# Patient Record
Sex: FEMALE | Race: WHITE | NOT HISPANIC OR LATINO | Employment: FULL TIME | URBAN - METROPOLITAN AREA
[De-identification: names, ages, dates, MRNs, and addresses within clinical notes are randomized per-mention and may not be internally consistent; named-entity substitution may affect disease eponyms.]

---

## 2021-04-22 LAB — HIV1+2 AB SERPL QL IA: NONREACTIVE

## 2021-04-23 LAB
ABO (EXTERNAL): NORMAL
HEMOGLOBIN (EXTERNAL): 13.7 G/DL (ref 11.7–15.7)
HEPATITIS B SURFACE ANTIGEN (EXTERNAL): NONREACTIVE
HIV1+2 AB SERPL QL IA: NEGATIVE
HIV1+2 AB SERPL QL IA: NEGATIVE
PLATELET COUNT (EXTERNAL): 359 10E3/UL (ref 150–450)
RH (EXTERNAL): POSITIVE
RUBELLA ANTIBODY IGG (EXTERNAL): POSITIVE
VDRL (SYPHILIS) (EXTERNAL): NONREACTIVE

## 2021-06-15 ENCOUNTER — TRANSCRIBE ORDERS (OUTPATIENT)
Dept: MATERNAL FETAL MEDICINE | Facility: CLINIC | Age: 31
End: 2021-06-15

## 2021-06-15 ENCOUNTER — MEDICAL CORRESPONDENCE (OUTPATIENT)
Dept: HEALTH INFORMATION MANAGEMENT | Facility: CLINIC | Age: 31
End: 2021-06-15

## 2021-06-15 ENCOUNTER — TRANSFERRED RECORDS (OUTPATIENT)
Dept: HEALTH INFORMATION MANAGEMENT | Facility: CLINIC | Age: 31
End: 2021-06-15

## 2021-06-15 DIAGNOSIS — O26.90 PREGNANCY RELATED CONDITION, ANTEPARTUM: Primary | ICD-10-CM

## 2021-06-17 ENCOUNTER — RECORDS - HEALTHEAST (OUTPATIENT)
Dept: ADMINISTRATIVE | Facility: OTHER | Age: 31
End: 2021-06-17

## 2021-07-05 ENCOUNTER — AMBULATORY - HEALTHEAST (OUTPATIENT)
Dept: MATERNAL FETAL MEDICINE | Facility: HOSPITAL | Age: 31
End: 2021-07-05

## 2021-07-09 ENCOUNTER — RECORDS - HEALTHEAST (OUTPATIENT)
Dept: ADMINISTRATIVE | Facility: OTHER | Age: 31
End: 2021-07-09

## 2021-07-09 ENCOUNTER — RECORDS - HEALTHEAST (OUTPATIENT)
Dept: ULTRASOUND IMAGING | Facility: HOSPITAL | Age: 31
End: 2021-07-09

## 2021-07-09 DIAGNOSIS — O26.90 PREGNANCY RELATED CONDITIONS, UNSPECIFIED, UNSPECIFIED TRIMESTER: ICD-10-CM

## 2021-07-20 ENCOUNTER — TRANSCRIBE ORDERS (OUTPATIENT)
Dept: CARDIOLOGY | Facility: HOSPITAL | Age: 31
End: 2021-07-20

## 2021-07-20 DIAGNOSIS — O10.919 CHRONIC HYPERTENSION IN OBSTETRIC CONTEXT: Primary | ICD-10-CM

## 2021-07-22 ENCOUNTER — DOCUMENTATION ONLY (OUTPATIENT)
Dept: ADMINISTRATIVE | Facility: OTHER | Age: 31
End: 2021-07-22

## 2021-07-22 NOTE — PROGRESS NOTES
This encounter was created as part of manual pregnancy episode data conversion for the single EHR project. The following information (where applicable) was manually abstracted from the [employer] Epic instance on July 22, 2021: pregnancy episode name/date, dating, episode encounter linking, pregravid weight, number of fetuses, and pregnancy overview and plan.     Veronica Connor RN   Clinical Informatics

## 2021-08-12 ENCOUNTER — HOSPITAL ENCOUNTER (OUTPATIENT)
Dept: CARDIOLOGY | Facility: HOSPITAL | Age: 31
End: 2021-08-12
Attending: OBSTETRICS & GYNECOLOGY
Payer: COMMERCIAL

## 2021-08-12 LAB
ATRIAL RATE - MUSE: 68 BPM
DIASTOLIC BLOOD PRESSURE - MUSE: NORMAL MMHG
INTERPRETATION ECG - MUSE: NORMAL
P AXIS - MUSE: 48 DEGREES
PR INTERVAL - MUSE: 150 MS
QRS DURATION - MUSE: 76 MS
QT - MUSE: 394 MS
QTC - MUSE: 418 MS
R AXIS - MUSE: 74 DEGREES
SYSTOLIC BLOOD PRESSURE - MUSE: NORMAL MMHG
T AXIS - MUSE: 59 DEGREES
VENTRICULAR RATE- MUSE: 68 BPM

## 2021-11-05 LAB — GROUP B STREPTOCOCCUS (EXTERNAL): NEGATIVE

## 2021-11-27 ENCOUNTER — HOSPITAL ENCOUNTER (INPATIENT)
Facility: HOSPITAL | Age: 31
LOS: 5 days | Discharge: HOME HEALTH | End: 2021-12-02
Attending: OBSTETRICS & GYNECOLOGY | Admitting: OBSTETRICS & GYNECOLOGY
Payer: COMMERCIAL

## 2021-11-27 DIAGNOSIS — O10.919 CHRONIC HYPERTENSION AFFECTING PREGNANCY: ICD-10-CM

## 2021-11-27 LAB
ABO/RH(D): NORMAL
ALBUMIN MFR UR ELPH: <7 MG/DL
ALT SERPL W P-5'-P-CCNC: 11 U/L (ref 0–45)
ANTIBODY SCREEN: NEGATIVE
AST SERPL W P-5'-P-CCNC: 19 U/L (ref 0–40)
CREAT SERPL-MCNC: 0.72 MG/DL (ref 0.6–1.1)
CREAT UR-MCNC: 79 MG/DL
ERYTHROCYTE [DISTWIDTH] IN BLOOD BY AUTOMATED COUNT: 12.8 % (ref 10–15)
GFR SERPL CREATININE-BSD FRML MDRD: >90 ML/MIN/1.73M2
HCT VFR BLD AUTO: 33.4 % (ref 35–47)
HGB BLD-MCNC: 11 G/DL (ref 11.7–15.7)
MCH RBC QN AUTO: 28.3 PG (ref 26.5–33)
MCHC RBC AUTO-ENTMCNC: 32.9 G/DL (ref 31.5–36.5)
MCV RBC AUTO: 86 FL (ref 78–100)
PLATELET # BLD AUTO: 280 10E3/UL (ref 150–450)
PROT/CREAT 24H UR: NORMAL MG/G{CREAT}
RBC # BLD AUTO: 3.89 10E6/UL (ref 3.8–5.2)
SARS-COV-2 RNA RESP QL NAA+PROBE: NEGATIVE
SPECIMEN EXPIRATION DATE: NORMAL
WBC # BLD AUTO: 8.1 10E3/UL (ref 4–11)

## 2021-11-27 PROCEDURE — 250N000013 HC RX MED GY IP 250 OP 250 PS 637: Performed by: OBSTETRICS & GYNECOLOGY

## 2021-11-27 PROCEDURE — 258N000003 HC RX IP 258 OP 636: Performed by: OBSTETRICS & GYNECOLOGY

## 2021-11-27 PROCEDURE — 84156 ASSAY OF PROTEIN URINE: CPT | Performed by: OBSTETRICS & GYNECOLOGY

## 2021-11-27 PROCEDURE — 3E0P7VZ INTRODUCTION OF HORMONE INTO FEMALE REPRODUCTIVE, VIA NATURAL OR ARTIFICIAL OPENING: ICD-10-PCS | Performed by: OBSTETRICS & GYNECOLOGY

## 2021-11-27 PROCEDURE — 85027 COMPLETE CBC AUTOMATED: CPT | Performed by: OBSTETRICS & GYNECOLOGY

## 2021-11-27 PROCEDURE — 86900 BLOOD TYPING SEROLOGIC ABO: CPT | Performed by: OBSTETRICS & GYNECOLOGY

## 2021-11-27 PROCEDURE — 82565 ASSAY OF CREATININE: CPT | Performed by: OBSTETRICS & GYNECOLOGY

## 2021-11-27 PROCEDURE — 87635 SARS-COV-2 COVID-19 AMP PRB: CPT | Performed by: OBSTETRICS & GYNECOLOGY

## 2021-11-27 PROCEDURE — 84450 TRANSFERASE (AST) (SGOT): CPT | Performed by: OBSTETRICS & GYNECOLOGY

## 2021-11-27 PROCEDURE — 84460 ALANINE AMINO (ALT) (SGPT): CPT | Performed by: OBSTETRICS & GYNECOLOGY

## 2021-11-27 PROCEDURE — 36415 COLL VENOUS BLD VENIPUNCTURE: CPT | Performed by: OBSTETRICS & GYNECOLOGY

## 2021-11-27 PROCEDURE — 86780 TREPONEMA PALLIDUM: CPT | Performed by: OBSTETRICS & GYNECOLOGY

## 2021-11-27 PROCEDURE — 120N000001 HC R&B MED SURG/OB

## 2021-11-27 PROCEDURE — 3E033VJ INTRODUCTION OF OTHER HORMONE INTO PERIPHERAL VEIN, PERCUTANEOUS APPROACH: ICD-10-PCS | Performed by: OBSTETRICS & GYNECOLOGY

## 2021-11-27 RX ORDER — MAGNESIUM SULFATE HEPTAHYDRATE 40 MG/ML
2 INJECTION, SOLUTION INTRAVENOUS
Status: DISCONTINUED | OUTPATIENT
Start: 2021-11-27 | End: 2021-12-02 | Stop reason: HOSPADM

## 2021-11-27 RX ORDER — FENTANYL CITRATE 50 UG/ML
50-100 INJECTION, SOLUTION INTRAMUSCULAR; INTRAVENOUS
Status: DISCONTINUED | OUTPATIENT
Start: 2021-11-27 | End: 2021-11-29 | Stop reason: HOSPADM

## 2021-11-27 RX ORDER — NIFEDIPINE 60 MG/1
1 TABLET, EXTENDED RELEASE ORAL DAILY
COMMUNITY
Start: 2021-05-25 | End: 2022-03-01

## 2021-11-27 RX ORDER — PROCHLORPERAZINE 25 MG
25 SUPPOSITORY, RECTAL RECTAL EVERY 12 HOURS PRN
Status: DISCONTINUED | OUTPATIENT
Start: 2021-11-27 | End: 2021-11-29 | Stop reason: HOSPADM

## 2021-11-27 RX ORDER — SODIUM CHLORIDE, SODIUM LACTATE, POTASSIUM CHLORIDE, CALCIUM CHLORIDE 600; 310; 30; 20 MG/100ML; MG/100ML; MG/100ML; MG/100ML
10-125 INJECTION, SOLUTION INTRAVENOUS CONTINUOUS
Status: DISCONTINUED | OUTPATIENT
Start: 2021-11-27 | End: 2021-12-02 | Stop reason: HOSPADM

## 2021-11-27 RX ORDER — METOCLOPRAMIDE 10 MG/1
10 TABLET ORAL EVERY 6 HOURS PRN
Status: DISCONTINUED | OUTPATIENT
Start: 2021-11-27 | End: 2021-11-29 | Stop reason: HOSPADM

## 2021-11-27 RX ORDER — IBUPROFEN 600 MG/1
600 TABLET, FILM COATED ORAL
Status: ACTIVE | OUTPATIENT
Start: 2021-11-27 | End: 2021-12-02

## 2021-11-27 RX ORDER — NALOXONE HYDROCHLORIDE 0.4 MG/ML
0.4 INJECTION, SOLUTION INTRAMUSCULAR; INTRAVENOUS; SUBCUTANEOUS
Status: DISCONTINUED | OUTPATIENT
Start: 2021-11-27 | End: 2021-11-29 | Stop reason: HOSPADM

## 2021-11-27 RX ORDER — KETOROLAC TROMETHAMINE 30 MG/ML
30 INJECTION, SOLUTION INTRAMUSCULAR; INTRAVENOUS
Status: DISPENSED | OUTPATIENT
Start: 2021-11-27 | End: 2021-12-02

## 2021-11-27 RX ORDER — OXYTOCIN 10 [USP'U]/ML
10 INJECTION, SOLUTION INTRAMUSCULAR; INTRAVENOUS
Status: DISCONTINUED | OUTPATIENT
Start: 2021-11-27 | End: 2021-12-02 | Stop reason: HOSPADM

## 2021-11-27 RX ORDER — METOCLOPRAMIDE HYDROCHLORIDE 5 MG/ML
10 INJECTION INTRAMUSCULAR; INTRAVENOUS EVERY 6 HOURS PRN
Status: DISCONTINUED | OUTPATIENT
Start: 2021-11-27 | End: 2021-11-29 | Stop reason: HOSPADM

## 2021-11-27 RX ORDER — PROCHLORPERAZINE MALEATE 10 MG
10 TABLET ORAL EVERY 6 HOURS PRN
Status: DISCONTINUED | OUTPATIENT
Start: 2021-11-27 | End: 2021-11-29 | Stop reason: HOSPADM

## 2021-11-27 RX ORDER — KETOROLAC TROMETHAMINE 30 MG/ML
30 INJECTION, SOLUTION INTRAMUSCULAR; INTRAVENOUS
Status: ACTIVE | OUTPATIENT
Start: 2021-11-27 | End: 2021-12-02

## 2021-11-27 RX ORDER — LORAZEPAM 2 MG/ML
2 INJECTION INTRAMUSCULAR
Status: DISCONTINUED | OUTPATIENT
Start: 2021-11-27 | End: 2021-12-02 | Stop reason: HOSPADM

## 2021-11-27 RX ORDER — CARBOPROST TROMETHAMINE 250 UG/ML
250 INJECTION, SOLUTION INTRAMUSCULAR
Status: DISCONTINUED | OUTPATIENT
Start: 2021-11-27 | End: 2021-11-29 | Stop reason: HOSPADM

## 2021-11-27 RX ORDER — NIFEDIPINE 30 MG
30 TABLET, EXTENDED RELEASE ORAL AT BEDTIME
Status: DISCONTINUED | OUTPATIENT
Start: 2021-11-27 | End: 2021-11-27

## 2021-11-27 RX ORDER — MORPHINE SULFATE 10 MG/ML
10 INJECTION, SOLUTION INTRAMUSCULAR; INTRAVENOUS ONCE
Status: DISCONTINUED | OUTPATIENT
Start: 2021-11-28 | End: 2021-12-02 | Stop reason: HOSPADM

## 2021-11-27 RX ORDER — MISOPROSTOL 200 UG/1
800 TABLET ORAL
Status: DISCONTINUED | OUTPATIENT
Start: 2021-11-27 | End: 2021-11-29 | Stop reason: HOSPADM

## 2021-11-27 RX ORDER — HYDROXYZINE HYDROCHLORIDE 50 MG/1
50-100 TABLET, FILM COATED ORAL EVERY 6 HOURS PRN
Status: DISCONTINUED | OUTPATIENT
Start: 2021-11-27 | End: 2021-11-28

## 2021-11-27 RX ORDER — NALOXONE HYDROCHLORIDE 0.4 MG/ML
0.2 INJECTION, SOLUTION INTRAMUSCULAR; INTRAVENOUS; SUBCUTANEOUS
Status: DISCONTINUED | OUTPATIENT
Start: 2021-11-27 | End: 2021-11-29 | Stop reason: HOSPADM

## 2021-11-27 RX ORDER — MAGNESIUM SULFATE 4 G/50ML
4 INJECTION INTRAVENOUS
Status: DISCONTINUED | OUTPATIENT
Start: 2021-11-27 | End: 2021-12-02 | Stop reason: HOSPADM

## 2021-11-27 RX ORDER — ONDANSETRON 2 MG/ML
4 INJECTION INTRAMUSCULAR; INTRAVENOUS EVERY 6 HOURS PRN
Status: DISCONTINUED | OUTPATIENT
Start: 2021-11-27 | End: 2021-11-29 | Stop reason: HOSPADM

## 2021-11-27 RX ORDER — METHYLERGONOVINE MALEATE 0.2 MG/ML
200 INJECTION INTRAVENOUS
Status: DISCONTINUED | OUTPATIENT
Start: 2021-11-27 | End: 2021-11-29 | Stop reason: HOSPADM

## 2021-11-27 RX ORDER — OXYTOCIN/0.9 % SODIUM CHLORIDE 30/500 ML
340 PLASTIC BAG, INJECTION (ML) INTRAVENOUS CONTINUOUS PRN
Status: DISCONTINUED | OUTPATIENT
Start: 2021-11-27 | End: 2021-11-29 | Stop reason: HOSPADM

## 2021-11-27 RX ORDER — NIFEDIPINE 60 MG/1
60 TABLET, EXTENDED RELEASE ORAL AT BEDTIME
Status: DISCONTINUED | OUTPATIENT
Start: 2021-11-27 | End: 2021-12-02 | Stop reason: HOSPADM

## 2021-11-27 RX ORDER — MISOPROSTOL 200 UG/1
400 TABLET ORAL
Status: DISCONTINUED | OUTPATIENT
Start: 2021-11-27 | End: 2021-11-29 | Stop reason: HOSPADM

## 2021-11-27 RX ORDER — ONDANSETRON 4 MG/1
4 TABLET, ORALLY DISINTEGRATING ORAL EVERY 6 HOURS PRN
Status: DISCONTINUED | OUTPATIENT
Start: 2021-11-27 | End: 2021-11-29 | Stop reason: HOSPADM

## 2021-11-27 RX ORDER — LABETALOL HYDROCHLORIDE 5 MG/ML
20-80 INJECTION, SOLUTION INTRAVENOUS EVERY 10 MIN PRN
Status: DISCONTINUED | OUTPATIENT
Start: 2021-11-27 | End: 2021-12-02 | Stop reason: HOSPADM

## 2021-11-27 RX ORDER — HYDRALAZINE HYDROCHLORIDE 20 MG/ML
10 INJECTION INTRAMUSCULAR; INTRAVENOUS
Status: DISCONTINUED | OUTPATIENT
Start: 2021-11-27 | End: 2021-12-02 | Stop reason: HOSPADM

## 2021-11-27 RX ORDER — MAGNESIUM SULFATE HEPTAHYDRATE 500 MG/ML
10 INJECTION, SOLUTION INTRAMUSCULAR; INTRAVENOUS
Status: DISCONTINUED | OUTPATIENT
Start: 2021-11-27 | End: 2021-12-02 | Stop reason: HOSPADM

## 2021-11-27 RX ORDER — OXYTOCIN/0.9 % SODIUM CHLORIDE 30/500 ML
100-340 PLASTIC BAG, INJECTION (ML) INTRAVENOUS CONTINUOUS PRN
Status: DISCONTINUED | OUTPATIENT
Start: 2021-11-27 | End: 2021-12-02 | Stop reason: HOSPADM

## 2021-11-27 RX ORDER — MISOPROSTOL 100 UG/1
25 TABLET ORAL
Status: DISCONTINUED | OUTPATIENT
Start: 2021-11-27 | End: 2021-11-27

## 2021-11-27 RX ORDER — HYDROXYZINE HYDROCHLORIDE 50 MG/1
50 TABLET, FILM COATED ORAL EVERY 6 HOURS PRN
Status: DISCONTINUED | OUTPATIENT
Start: 2021-11-27 | End: 2021-11-28

## 2021-11-27 RX ORDER — ACETAMINOPHEN 325 MG/1
650 TABLET ORAL EVERY 4 HOURS PRN
Status: DISCONTINUED | OUTPATIENT
Start: 2021-11-27 | End: 2021-11-29 | Stop reason: HOSPADM

## 2021-11-27 RX ORDER — OXYTOCIN 10 [USP'U]/ML
10 INJECTION, SOLUTION INTRAMUSCULAR; INTRAVENOUS
Status: DISCONTINUED | OUTPATIENT
Start: 2021-11-27 | End: 2021-11-29 | Stop reason: HOSPADM

## 2021-11-27 RX ADMIN — Medication 25 MCG: at 14:10

## 2021-11-27 RX ADMIN — SODIUM CHLORIDE, POTASSIUM CHLORIDE, SODIUM LACTATE AND CALCIUM CHLORIDE 500 ML: 600; 310; 30; 20 INJECTION, SOLUTION INTRAVENOUS at 20:19

## 2021-11-27 RX ADMIN — NIFEDIPINE 60 MG: 60 TABLET, FILM COATED, EXTENDED RELEASE ORAL at 21:53

## 2021-11-27 RX ADMIN — DINOPROSTONE 10 MG: 10 INSERT VAGINAL at 22:20

## 2021-11-27 RX ADMIN — Medication 25 MCG: at 12:16

## 2021-11-27 RX ADMIN — Medication 25 MCG: at 10:09

## 2021-11-27 RX ADMIN — HYDROXYZINE HYDROCHLORIDE 50 MG: 50 TABLET ORAL at 23:53

## 2021-11-27 RX ADMIN — Medication 25 MCG: at 16:00

## 2021-11-27 ASSESSMENT — ACTIVITIES OF DAILY LIVING (ADL)
WEAR_GLASSES_OR_BLIND: YES
TOILETING_ISSUES: NO
CONCENTRATING,_REMEMBERING_OR_MAKING_DECISIONS_DIFFICULTY: NO
VISION_MANAGEMENT: CONTACTS
DIFFICULTY_COMMUNICATING: NO
DOING_ERRANDS_INDEPENDENTLY_DIFFICULTY: NO
DRESSING/BATHING_DIFFICULTY: NO

## 2021-11-27 ASSESSMENT — MIFFLIN-ST. JEOR: SCORE: 1570.44

## 2021-11-27 NOTE — H&P
OB Admission H&P     Date: 2021  Time: 12:57 PM   Fadia Cota, : 1990, MRN: [id number]     CC: Here for my IOL    HPI: Fadia Cota is a 31 year old  with  single inter-uterine gestation at 39w1d, with MARILOU of Estimated Date of Delivery: Dec 3, 2021. She presented to L&D with plan for IOL re: chronic hypertension on medication .  Pregnancy has been complicated by Hypertension. Patient denies headache, visual changes, RUQ pain. She denies contractions, leakage of fluid, or vaginal bleeding and reports good fetal movement.     OB History   OB History    Para Term  AB Living   1 0 0 0 0 0   SAB IAB Ectopic Multiple Live Births   0 0 0 0 0      # Outcome Date GA Lbr Tani/2nd Weight Sex Delivery Anes PTL Lv   1 Current                Past Medical History:  No past medical history on file.     Past Surgical History:   No past surgical history on file.     Social History   Reviewed, patient denies smoking, alcohol and drug use      Medications  Current Facility-Administered Medications   Medication     acetaminophen (TYLENOL) tablet 650 mg     carboprost (HEMABATE) injection 250 mcg     fentaNYL (PF) (SUBLIMAZE) injection  mcg     hydrALAZINE (APRESOLINE) injection 10 mg     ketorolac (TORADOL) injection 30 mg    Or     ketorolac (TORADOL) injection 30 mg    Or     ibuprofen (ADVIL/MOTRIN) tablet 600 mg     labetalol (NORMODYNE/TRANDATE) algorithm-medication instruction     labetalol (NORMODYNE/TRANDATE) injection 20-80 mg     lactated ringers BOLUS 1,000 mL    Or     lactated ringers BOLUS 500 mL     lactated ringers BOLUS 500 mL     lactated ringers infusion     lidocaine 1 % 0.1-20 mL     LORazepam (ATIVAN) injection 2 mg     magnesium sulfate 2 g in water intermittent infusion     magnesium sulfate 4 g in 50 mL sterile water (premade)     magnesium sulfate injection 10 g     Medication Instructions - cervical ripening and induction medications     methylergonovine (METHERGINE)  "injection 200 mcg     metoclopramide (REGLAN) injection 10 mg    Or     metoclopramide (REGLAN) tablet 10 mg     misoprostol (cervical ripening) (CYTOTEC) quarter-tab 25 mcg     misoprostol (CYTOTEC) tablet 400 mcg    Or     misoprostol (CYTOTEC) tablet 800 mcg     naloxone (NARCAN) injection 0.2 mg    Or     naloxone (NARCAN) injection 0.4 mg    Or     naloxone (NARCAN) injection 0.2 mg    Or     naloxone (NARCAN) injection 0.4 mg     NIFEdipine ER (ADALAT CC) 24 hr tablet 30 mg     nitrous oxide/oxygen 50/50 blend     ondansetron (ZOFRAN-ODT) ODT tab 4 mg    Or     ondansetron (ZOFRAN) injection 4 mg     oxytocin (PITOCIN) 30 units in 500 mL 0.9% NaCl infusion     oxytocin (PITOCIN) 30 units in 500 mL 0.9% NaCl infusion     oxytocin (PITOCIN) injection 10 Units     oxytocin (PITOCIN) injection 10 Units     prochlorperazine (COMPAZINE) injection 10 mg    Or     prochlorperazine (COMPAZINE) tablet 10 mg    Or     prochlorperazine (COMPAZINE) suppository 25 mg     tranexamic acid (CYKLOKAPRON) bolus 1 g vial attach to NaCl 50 or 100 mL bag ADULT       Allergies   No Known Allergies    ROS: otherwise negative except what is stated in HPI.     Physical Exam:   Vitals pending - /87   Temp 97.6  F (36.4  C) (Oral)   Resp 16   Ht 1.727 m (5' 8\")   Wt 81.6 kg (180 lb)   LMP 2021   SpO2 98%   BMI 27.37 kg/m     Gen: no acute distress, resting comfortably   CV: acyanotic   Heart: regular rate and rhythm   Pulm: unlabored respirations, clear to ausculation bilaterally    Abd: gravid, soft, nontender   Extremities: soft, nontender   FHR: Reactive NST on admission, currently cat I with accels  [facility]: irregular contractions 1-2 in 10  CE: /-2 , small amt bloody show with membrane sweep  Cephalic on BSUS       Impression:   Pt is a 30yo  at 39w1d ga admitted for IOL secondary to chronic HTN on medication:  -- Pt has a prior h/o cHTN. She was controlled on procardia 60mg XL throughout the pregnancy and " Bps have been well controlled  -- She had normal baseline labs and EKG   -- Repeat HELLP panel performed on admission and wnl  -- She was followed with  testing and growth scans. Most recent scan at 36wk was 93%ile    -- Pt was closed on admission, started on cytotec protocol. Cervical exam on my arrival 1cm/50%/-2, pt offered membrane sweep and she agreed. Was successful with small amt of bloody show. Pt tolerated well     Issues:  Abnormal 1 hr GTT, nml 3hr  Nml anatomy   GBS neg      Plan:   -- Monitor Bps, treat severe range per protocol. Continue procardia xl while in house  -- IOL with cytotec for cervical ripening x24 hrs per protocol  -- Continuous fetal monitoring/toco      Christine Henry, DO

## 2021-11-27 NOTE — PROGRESS NOTES
Pt presented 0815 for induction of labor G 1 hx of HNT on meds. Pt oriented to room and call light. VSS pt denies sx of HNT . Little edema non- pitting Dr Henry updated SVE as noted and called for SVE update. cephalic by leopold. Diane at bedside confirmed with US. Pt mediated as noted induction started Cater gory 1 fetal heart tracing.

## 2021-11-28 ENCOUNTER — ANESTHESIA EVENT (OUTPATIENT)
Dept: OBGYN | Facility: HOSPITAL | Age: 31
End: 2021-11-28
Payer: COMMERCIAL

## 2021-11-28 ENCOUNTER — ANESTHESIA (OUTPATIENT)
Dept: OBGYN | Facility: HOSPITAL | Age: 31
End: 2021-11-28
Payer: COMMERCIAL

## 2021-11-28 LAB — T PALLIDUM AB SER QL: NONREACTIVE

## 2021-11-28 PROCEDURE — 250N000013 HC RX MED GY IP 250 OP 250 PS 637: Performed by: OBSTETRICS & GYNECOLOGY

## 2021-11-28 PROCEDURE — 120N000001 HC R&B MED SURG/OB

## 2021-11-28 PROCEDURE — 250N000009 HC RX 250: Performed by: ANESTHESIOLOGY

## 2021-11-28 PROCEDURE — 250N000011 HC RX IP 250 OP 636: Performed by: ANESTHESIOLOGY

## 2021-11-28 PROCEDURE — 250N000009 HC RX 250: Performed by: OBSTETRICS & GYNECOLOGY

## 2021-11-28 PROCEDURE — 258N000003 HC RX IP 258 OP 636: Performed by: OBSTETRICS & GYNECOLOGY

## 2021-11-28 PROCEDURE — 250N000011 HC RX IP 250 OP 636: Performed by: OBSTETRICS & GYNECOLOGY

## 2021-11-28 PROCEDURE — 258N000003 HC RX IP 258 OP 636: Performed by: ANESTHESIOLOGY

## 2021-11-28 PROCEDURE — 3E0R3BZ INTRODUCTION OF ANESTHETIC AGENT INTO SPINAL CANAL, PERCUTANEOUS APPROACH: ICD-10-PCS | Performed by: ANESTHESIOLOGY

## 2021-11-28 PROCEDURE — 10907ZC DRAINAGE OF AMNIOTIC FLUID, THERAPEUTIC FROM PRODUCTS OF CONCEPTION, VIA NATURAL OR ARTIFICIAL OPENING: ICD-10-PCS | Performed by: OBSTETRICS & GYNECOLOGY

## 2021-11-28 PROCEDURE — 00HU33Z INSERTION OF INFUSION DEVICE INTO SPINAL CANAL, PERCUTANEOUS APPROACH: ICD-10-PCS | Performed by: ANESTHESIOLOGY

## 2021-11-28 RX ORDER — LIDOCAINE HCL/EPINEPHRINE/PF 2%-1:200K
VIAL (ML) INJECTION
Status: COMPLETED | OUTPATIENT
Start: 2021-11-28 | End: 2021-11-28

## 2021-11-28 RX ORDER — SODIUM CHLORIDE, SODIUM LACTATE, POTASSIUM CHLORIDE, CALCIUM CHLORIDE 600; 310; 30; 20 MG/100ML; MG/100ML; MG/100ML; MG/100ML
INJECTION, SOLUTION INTRAVENOUS CONTINUOUS PRN
Status: DISCONTINUED | OUTPATIENT
Start: 2021-11-28 | End: 2021-11-29

## 2021-11-28 RX ORDER — NALBUPHINE HYDROCHLORIDE 10 MG/ML
2.5-5 INJECTION, SOLUTION INTRAMUSCULAR; INTRAVENOUS; SUBCUTANEOUS EVERY 6 HOURS PRN
Status: DISCONTINUED | OUTPATIENT
Start: 2021-11-28 | End: 2021-12-02 | Stop reason: HOSPADM

## 2021-11-28 RX ORDER — TERBUTALINE SULFATE 1 MG/ML
INJECTION, SOLUTION SUBCUTANEOUS
Status: DISCONTINUED
Start: 2021-11-28 | End: 2021-11-28 | Stop reason: WASHOUT

## 2021-11-28 RX ORDER — EPHEDRINE SULFATE 50 MG/ML
5 INJECTION, SOLUTION INTRAMUSCULAR; INTRAVENOUS; SUBCUTANEOUS
Status: DISCONTINUED | OUTPATIENT
Start: 2021-11-28 | End: 2021-11-29 | Stop reason: HOSPADM

## 2021-11-28 RX ORDER — LIDOCAINE 40 MG/G
CREAM TOPICAL
Status: DISCONTINUED | OUTPATIENT
Start: 2021-11-28 | End: 2021-11-29

## 2021-11-28 RX ORDER — FENTANYL CITRATE 50 UG/ML
INJECTION, SOLUTION INTRAMUSCULAR; INTRAVENOUS
Status: COMPLETED | OUTPATIENT
Start: 2021-11-28 | End: 2021-11-28

## 2021-11-28 RX ORDER — FENTANYL CITRATE 50 UG/ML
100 INJECTION, SOLUTION INTRAMUSCULAR; INTRAVENOUS ONCE
Status: DISCONTINUED | OUTPATIENT
Start: 2021-11-28 | End: 2021-11-29 | Stop reason: HOSPADM

## 2021-11-28 RX ORDER — OXYTOCIN/0.9 % SODIUM CHLORIDE 30/500 ML
1-24 PLASTIC BAG, INJECTION (ML) INTRAVENOUS CONTINUOUS
Status: DISCONTINUED | OUTPATIENT
Start: 2021-11-28 | End: 2021-11-29

## 2021-11-28 RX ADMIN — NIFEDIPINE 60 MG: 60 TABLET, FILM COATED, EXTENDED RELEASE ORAL at 21:24

## 2021-11-28 RX ADMIN — FENTANYL CITRATE 25 MCG: 50 INJECTION, SOLUTION INTRAMUSCULAR; INTRAVENOUS at 17:40

## 2021-11-28 RX ADMIN — Medication 2 MILLI-UNITS/MIN: at 11:09

## 2021-11-28 RX ADMIN — FENTANYL CITRATE 50 MCG: 50 INJECTION INTRAMUSCULAR; INTRAVENOUS at 15:52

## 2021-11-28 RX ADMIN — Medication: at 17:34

## 2021-11-28 RX ADMIN — HYDROXYZINE HYDROCHLORIDE 50 MG: 50 TABLET ORAL at 04:00

## 2021-11-28 RX ADMIN — Medication 5 MG: at 18:03

## 2021-11-28 RX ADMIN — Medication 2 MILLI-UNITS/MIN: at 18:33

## 2021-11-28 RX ADMIN — LIDOCAINE HYDROCHLORIDE,EPINEPHRINE BITARTRATE 2 ML: 20; .005 INJECTION, SOLUTION EPIDURAL; INFILTRATION; INTRACAUDAL; PERINEURAL at 17:40

## 2021-11-28 RX ADMIN — SODIUM CHLORIDE, POTASSIUM CHLORIDE, SODIUM LACTATE AND CALCIUM CHLORIDE 250 ML: 600; 310; 30; 20 INJECTION, SOLUTION INTRAVENOUS at 23:59

## 2021-11-28 RX ADMIN — SODIUM CHLORIDE, POTASSIUM CHLORIDE, SODIUM LACTATE AND CALCIUM CHLORIDE 25 ML/HR: 600; 310; 30; 20 INJECTION, SOLUTION INTRAVENOUS at 11:09

## 2021-11-28 RX ADMIN — METOCLOPRAMIDE HYDROCHLORIDE 10 MG: 5 INJECTION INTRAMUSCULAR; INTRAVENOUS at 22:40

## 2021-11-28 NOTE — PROGRESS NOTES
Dr. Henry called for an update. Patient is byron every 2-3 min, at 8munits, and palpating moderate. Patient breathing through her contractions and sitting in the tub for pain relief right now. Provider will be on unit later today to assess in person.

## 2021-11-28 NOTE — PROGRESS NOTES
Dr Henry informed that 1 dose of cytotec was held, due to frequent contractions. Will call if  Frequent contractions continue longer than 1 hour.

## 2021-11-28 NOTE — PROGRESS NOTES
Dr Henry informed that contractions are 2-3 minutes apart after IV Fluid bolus given. Also informed Dr of most current blood pressures. Orders received to Discontinue Cytotec and give cervidil.    (2) assistive person

## 2021-11-28 NOTE — PLAN OF CARE
Problem: Delayed Labor Progression (Labor)  Goal: Effective Progression to Delivery  Outcome: Improving     Fadia received sleep medications for therapeutic rest. She reports resting well overnight. Plan to remove cervidil after 12hrs and reassess plan of care.

## 2021-11-28 NOTE — PROGRESS NOTES
Called and updated Dr. Henry with cervadil removal and SVE. Orders to start low dose pit. Provider to place orders.

## 2021-11-28 NOTE — PROGRESS NOTES
Called and updated Dr. Henry on bp 160/89 and recheck of 148/94. Recently administered Fentanyl. Patient is asymptomatic, reflexes normal, and lung sounds clear. No new orders at this time.

## 2021-11-28 NOTE — ANESTHESIA PREPROCEDURE EVALUATION
Anesthesia Pre-Procedure Evaluation    Patient: Fadia Cota   MRN: [id number] : 1990        Preoperative Diagnosis: * No surgery found *    Procedure :           No past medical history on file.   No past surgical history on file.   No Known Allergies   Social History     Tobacco Use     Smoking status: Never Smoker     Smokeless tobacco: Never Used   Substance Use Topics     Alcohol use: Not Currently      Wt Readings from Last 1 Encounters:   21 81.2 kg (179 lb)        Anesthesia Evaluation   Pt has had prior anesthetic. Type: Regional.        ROS/MED HX  ENT/Pulmonary:  - neg pulmonary ROS     Neurologic:  - neg neurologic ROS     Cardiovascular:     (+) hypertension--PIH and BP Meds and Mild/mod ---    METS/Exercise Tolerance:     Hematologic:  - neg hematologic  ROS     Musculoskeletal:  - neg musculoskeletal ROS     GI/Hepatic:  - neg GI/hepatic ROS     Renal/Genitourinary:  - neg Renal ROS     Endo:  - neg endo ROS     Psychiatric/Substance Use:  - neg psychiatric ROS     Infectious Disease:  - neg infectious disease ROS     Malignancy:  - neg malignancy ROS     Other:      (+) Possibly pregnant, ,         Physical Exam    Airway        Mallampati: I   TM distance: > 3 FB   Neck ROM: full   Mouth opening: > 3 cm    Respiratory Devices and Support         Dental  no notable dental history         Cardiovascular   cardiovascular exam normal          Pulmonary   pulmonary exam normal                OUTSIDE LABS:  CBC:   Lab Results   Component Value Date    WBC 8.1 2021    HGB 11.0 (L) 2021    HCT 33.4 (L) 2021     2021     BMP:   Lab Results   Component Value Date    CR 0.72 2021     COAGS: No results found for: PTT, INR, FIBR  POC: No results found for: BGM, HCG, HCGS  HEPATIC:   Lab Results   Component Value Date    ALT 11 2021    AST 19 2021     OTHER: No results found for: PH, LACT, A1C, MARCELLA, PHOS, MAG, LIPASE, AMYLASE, TSH, T4, T3, CRP,  SED    Anesthesia Plan    ASA Status:  2      Anesthesia Type: Epidural.              Consents    Anesthesia Plan(s) and associated risks, benefits, and realistic alternatives discussed. Questions answered and patient/representative(s) expressed understanding.    - Discussed:     - Discussed with:  Spouse, Patient         Postoperative Care       PONV prophylaxis: Ondansetron (or other 5HT-3), Dexamethasone or Solumedrol     Comments:                Shay Brantley MD

## 2021-11-28 NOTE — PROGRESS NOTES
"S.   Pt seen and evaluated at the bedside. She has IV pitocin infusing. She reports she was able to get sleep overnight with the help of vistaril. She reports the ctx are now the most intense they have been. Ctx now more in the front rather than just the back. Using a heating pad for relief. Using the balance ball and ambulating the halls. Currently on 8U of pitocin    O. /85   Pulse 78   Temp 97.7  F (36.5  C) (Oral)   Resp 16   Ht 1.727 m (5' 8\")   Wt 81.2 kg (179 lb)   LMP 2021   SpO2 97%   BMI 27.22 kg/m      GEN: alert and oriented  Lungs: non labored  GYN: cervical exam tight 2cm/70/-2, very soft and mid position     Fetal status: cat I, 125 bpm, mod variability, +accels, no decels, ctx 3-4 in 10    A/P.  30yo  at 39w2d undergoing IOL 2/2 chronic hypertension on medication  -- S/p 10hr of cytotec and 12 hr of cervidil   -- Continue to titrate pitocin per protocol  -- Continue to monitor Bps, daily procardia 60mg XL  -- Anticipate further labor progress and cervical change    Christine Henry, DO    "

## 2021-11-29 LAB
ALT SERPL W P-5'-P-CCNC: <9 U/L (ref 0–45)
APTT PPP: 31 SECONDS (ref 22–38)
APTT PPP: 31 SECONDS (ref 22–38)
AST SERPL W P-5'-P-CCNC: 19 U/L (ref 0–40)
BASOPHILS # BLD AUTO: 0.1 10E3/UL (ref 0–0.2)
BASOPHILS NFR BLD AUTO: 0 %
CREAT SERPL-MCNC: 1.14 MG/DL (ref 0.6–1.1)
CREAT SERPL-MCNC: 1.32 MG/DL (ref 0.6–1.1)
D DIMER PPP FEU-MCNC: >20 UG/ML FEU (ref 0–0.5)
EOSINOPHIL # BLD AUTO: 0 10E3/UL (ref 0–0.7)
EOSINOPHIL NFR BLD AUTO: 0 %
ERYTHROCYTE [DISTWIDTH] IN BLOOD BY AUTOMATED COUNT: 13.3 % (ref 10–15)
ERYTHROCYTE [DISTWIDTH] IN BLOOD BY AUTOMATED COUNT: 13.4 % (ref 10–15)
FIBRINOGEN PPP-MCNC: 488 MG/DL (ref 170–490)
GFR SERPL CREATININE-BSD FRML MDRD: 53 ML/MIN/1.73M2
GFR SERPL CREATININE-BSD FRML MDRD: 64 ML/MIN/1.73M2
HCT VFR BLD AUTO: 29.1 % (ref 35–47)
HCT VFR BLD AUTO: 36.3 % (ref 35–47)
HGB BLD-MCNC: 11.9 G/DL (ref 11.7–15.7)
HGB BLD-MCNC: 9.5 G/DL (ref 11.7–15.7)
HOLD SPECIMEN: NORMAL
HOLD SPECIMEN: NORMAL
IMM GRANULOCYTES # BLD: 0.1 10E3/UL
IMM GRANULOCYTES NFR BLD: 1 %
INR PPP: 1.03 (ref 0.9–1.15)
INR PPP: 1.16 (ref 0.9–1.15)
LYMPHOCYTES # BLD AUTO: 1.1 10E3/UL (ref 0.8–5.3)
LYMPHOCYTES NFR BLD AUTO: 5 %
MCH RBC QN AUTO: 28.1 PG (ref 26.5–33)
MCH RBC QN AUTO: 28.6 PG (ref 26.5–33)
MCHC RBC AUTO-ENTMCNC: 32.6 G/DL (ref 31.5–36.5)
MCHC RBC AUTO-ENTMCNC: 32.8 G/DL (ref 31.5–36.5)
MCV RBC AUTO: 86 FL (ref 78–100)
MCV RBC AUTO: 88 FL (ref 78–100)
MONOCYTES # BLD AUTO: 1.1 10E3/UL (ref 0–1.3)
MONOCYTES NFR BLD AUTO: 6 %
NEUTROPHILS # BLD AUTO: 17.8 10E3/UL (ref 1.6–8.3)
NEUTROPHILS NFR BLD AUTO: 88 %
NRBC # BLD AUTO: 0 10E3/UL
NRBC BLD AUTO-RTO: 0 /100
PLATELET # BLD AUTO: 233 10E3/UL (ref 150–450)
PLATELET # BLD AUTO: 275 10E3/UL (ref 150–450)
RBC # BLD AUTO: 3.32 10E6/UL (ref 3.8–5.2)
RBC # BLD AUTO: 4.23 10E6/UL (ref 3.8–5.2)
WBC # BLD AUTO: 20 10E3/UL (ref 4–11)
WBC # BLD AUTO: 26.7 10E3/UL (ref 4–11)

## 2021-11-29 PROCEDURE — 999N000249 HC STATISTIC C-SECTION ON UNIT

## 2021-11-29 PROCEDURE — 258N000003 HC RX IP 258 OP 636: Performed by: OBSTETRICS & GYNECOLOGY

## 2021-11-29 PROCEDURE — 250N000011 HC RX IP 250 OP 636: Performed by: ANESTHESIOLOGY

## 2021-11-29 PROCEDURE — 250N000025 HC SEVOFLURANE, PER MIN: Performed by: OBSTETRICS & GYNECOLOGY

## 2021-11-29 PROCEDURE — 360N000076 HC SURGERY LEVEL 3, PER MIN: Performed by: OBSTETRICS & GYNECOLOGY

## 2021-11-29 PROCEDURE — 250N000011 HC RX IP 250 OP 636: Performed by: OBSTETRICS & GYNECOLOGY

## 2021-11-29 PROCEDURE — 250N000011 HC RX IP 250 OP 636: Performed by: NURSE ANESTHETIST, CERTIFIED REGISTERED

## 2021-11-29 PROCEDURE — 36415 COLL VENOUS BLD VENIPUNCTURE: CPT | Performed by: OBSTETRICS & GYNECOLOGY

## 2021-11-29 PROCEDURE — 82565 ASSAY OF CREATININE: CPT | Performed by: OBSTETRICS & GYNECOLOGY

## 2021-11-29 PROCEDURE — 85379 FIBRIN DEGRADATION QUANT: CPT | Performed by: OBSTETRICS & GYNECOLOGY

## 2021-11-29 PROCEDURE — 120N000001 HC R&B MED SURG/OB

## 2021-11-29 PROCEDURE — 250N000009 HC RX 250: Performed by: OBSTETRICS & GYNECOLOGY

## 2021-11-29 PROCEDURE — 85610 PROTHROMBIN TIME: CPT | Performed by: OBSTETRICS & GYNECOLOGY

## 2021-11-29 PROCEDURE — 84450 TRANSFERASE (AST) (SGOT): CPT | Performed by: OBSTETRICS & GYNECOLOGY

## 2021-11-29 PROCEDURE — 250N000013 HC RX MED GY IP 250 OP 250 PS 637: Performed by: OBSTETRICS & GYNECOLOGY

## 2021-11-29 PROCEDURE — 85025 COMPLETE CBC W/AUTO DIFF WBC: CPT | Performed by: OBSTETRICS & GYNECOLOGY

## 2021-11-29 PROCEDURE — 370N000017 HC ANESTHESIA TECHNICAL FEE, PER MIN: Performed by: OBSTETRICS & GYNECOLOGY

## 2021-11-29 PROCEDURE — 250N000009 HC RX 250: Performed by: ANESTHESIOLOGY

## 2021-11-29 PROCEDURE — 85048 AUTOMATED LEUKOCYTE COUNT: CPT | Performed by: OBSTETRICS & GYNECOLOGY

## 2021-11-29 PROCEDURE — 85384 FIBRINOGEN ACTIVITY: CPT | Performed by: OBSTETRICS & GYNECOLOGY

## 2021-11-29 PROCEDURE — 272N000001 HC OR GENERAL SUPPLY STERILE: Performed by: OBSTETRICS & GYNECOLOGY

## 2021-11-29 PROCEDURE — 710N000009 HC RECOVERY PHASE 1, LEVEL 1, PER MIN: Performed by: OBSTETRICS & GYNECOLOGY

## 2021-11-29 PROCEDURE — 250N000009 HC RX 250: Performed by: NURSE ANESTHETIST, CERTIFIED REGISTERED

## 2021-11-29 PROCEDURE — 258N000003 HC RX IP 258 OP 636: Performed by: NURSE ANESTHETIST, CERTIFIED REGISTERED

## 2021-11-29 PROCEDURE — 85730 THROMBOPLASTIN TIME PARTIAL: CPT | Performed by: OBSTETRICS & GYNECOLOGY

## 2021-11-29 PROCEDURE — 84460 ALANINE AMINO (ALT) (SGPT): CPT | Performed by: OBSTETRICS & GYNECOLOGY

## 2021-11-29 RX ORDER — LIDOCAINE 40 MG/G
CREAM TOPICAL
Status: DISCONTINUED | OUTPATIENT
Start: 2021-11-29 | End: 2021-12-02 | Stop reason: HOSPADM

## 2021-11-29 RX ORDER — CEFAZOLIN SODIUM 2 G/100ML
2 INJECTION, SOLUTION INTRAVENOUS SEE ADMIN INSTRUCTIONS
Status: DISCONTINUED | OUTPATIENT
Start: 2021-11-29 | End: 2021-11-29 | Stop reason: HOSPADM

## 2021-11-29 RX ORDER — METOCLOPRAMIDE HYDROCHLORIDE 5 MG/ML
10 INJECTION INTRAMUSCULAR; INTRAVENOUS EVERY 6 HOURS PRN
Status: DISCONTINUED | OUTPATIENT
Start: 2021-11-29 | End: 2021-12-02 | Stop reason: HOSPADM

## 2021-11-29 RX ORDER — AMOXICILLIN 250 MG
1 CAPSULE ORAL 2 TIMES DAILY
Status: DISCONTINUED | OUTPATIENT
Start: 2021-11-29 | End: 2021-12-02 | Stop reason: HOSPADM

## 2021-11-29 RX ORDER — CITRIC ACID/SODIUM CITRATE 334-500MG
30 SOLUTION, ORAL ORAL
Status: COMPLETED | OUTPATIENT
Start: 2021-11-29 | End: 2021-11-29

## 2021-11-29 RX ORDER — OXYTOCIN 10 [USP'U]/ML
10 INJECTION, SOLUTION INTRAMUSCULAR; INTRAVENOUS
Status: DISCONTINUED | OUTPATIENT
Start: 2021-11-29 | End: 2021-12-02 | Stop reason: HOSPADM

## 2021-11-29 RX ORDER — GLYCOPYRROLATE 0.2 MG/ML
INJECTION, SOLUTION INTRAMUSCULAR; INTRAVENOUS PRN
Status: DISCONTINUED | OUTPATIENT
Start: 2021-11-29 | End: 2021-11-29

## 2021-11-29 RX ORDER — OXYTOCIN/0.9 % SODIUM CHLORIDE 30/500 ML
340 PLASTIC BAG, INJECTION (ML) INTRAVENOUS CONTINUOUS PRN
Status: DISCONTINUED | OUTPATIENT
Start: 2021-11-29 | End: 2021-12-02 | Stop reason: HOSPADM

## 2021-11-29 RX ORDER — ONDANSETRON 2 MG/ML
4 INJECTION INTRAMUSCULAR; INTRAVENOUS EVERY 6 HOURS PRN
Status: DISCONTINUED | OUTPATIENT
Start: 2021-11-29 | End: 2021-12-02 | Stop reason: HOSPADM

## 2021-11-29 RX ORDER — CARBOPROST TROMETHAMINE 250 UG/ML
250 INJECTION, SOLUTION INTRAMUSCULAR
Status: DISCONTINUED | OUTPATIENT
Start: 2021-11-29 | End: 2021-11-29 | Stop reason: HOSPADM

## 2021-11-29 RX ORDER — DIPHENHYDRAMINE HCL 25 MG
25 TABLET ORAL ONCE
Status: COMPLETED | OUTPATIENT
Start: 2021-11-29 | End: 2021-11-29

## 2021-11-29 RX ORDER — NALOXONE HYDROCHLORIDE 0.4 MG/ML
0.2 INJECTION, SOLUTION INTRAMUSCULAR; INTRAVENOUS; SUBCUTANEOUS
Status: DISCONTINUED | OUTPATIENT
Start: 2021-11-29 | End: 2021-12-02 | Stop reason: HOSPADM

## 2021-11-29 RX ORDER — ONDANSETRON 4 MG/1
4 TABLET, ORALLY DISINTEGRATING ORAL EVERY 6 HOURS PRN
Status: DISCONTINUED | OUTPATIENT
Start: 2021-11-29 | End: 2021-12-02 | Stop reason: HOSPADM

## 2021-11-29 RX ORDER — NALOXONE HYDROCHLORIDE 0.4 MG/ML
0.4 INJECTION, SOLUTION INTRAMUSCULAR; INTRAVENOUS; SUBCUTANEOUS
Status: DISCONTINUED | OUTPATIENT
Start: 2021-11-29 | End: 2021-12-02 | Stop reason: HOSPADM

## 2021-11-29 RX ORDER — METOCLOPRAMIDE 10 MG/1
10 TABLET ORAL EVERY 6 HOURS PRN
Status: DISCONTINUED | OUTPATIENT
Start: 2021-11-29 | End: 2021-12-02 | Stop reason: HOSPADM

## 2021-11-29 RX ORDER — MISOPROSTOL 200 UG/1
800 TABLET ORAL
Status: DISCONTINUED | OUTPATIENT
Start: 2021-11-29 | End: 2021-12-02 | Stop reason: HOSPADM

## 2021-11-29 RX ORDER — ONDANSETRON 4 MG/1
4 TABLET, ORALLY DISINTEGRATING ORAL EVERY 30 MIN PRN
Status: DISCONTINUED | OUTPATIENT
Start: 2021-11-29 | End: 2021-11-29 | Stop reason: HOSPADM

## 2021-11-29 RX ORDER — ONDANSETRON 2 MG/ML
4 INJECTION INTRAMUSCULAR; INTRAVENOUS EVERY 30 MIN PRN
Status: DISCONTINUED | OUTPATIENT
Start: 2021-11-29 | End: 2021-11-29 | Stop reason: HOSPADM

## 2021-11-29 RX ORDER — HYDROMORPHONE HCL IN WATER/PF 6 MG/30 ML
0.2 PATIENT CONTROLLED ANALGESIA SYRINGE INTRAVENOUS EVERY 5 MIN PRN
Status: DISCONTINUED | OUTPATIENT
Start: 2021-11-29 | End: 2021-11-29 | Stop reason: HOSPADM

## 2021-11-29 RX ORDER — METHYLERGONOVINE MALEATE 0.2 MG/ML
200 INJECTION INTRAVENOUS
Status: DISCONTINUED | OUTPATIENT
Start: 2021-11-29 | End: 2021-12-02 | Stop reason: HOSPADM

## 2021-11-29 RX ORDER — OXYCODONE HYDROCHLORIDE 5 MG/1
5 TABLET ORAL EVERY 4 HOURS PRN
Status: DISCONTINUED | OUTPATIENT
Start: 2021-11-29 | End: 2021-12-02 | Stop reason: HOSPADM

## 2021-11-29 RX ORDER — MORPHINE SULFATE 1 MG/ML
INJECTION, SOLUTION EPIDURAL; INTRATHECAL; INTRAVENOUS PRN
Status: DISCONTINUED | OUTPATIENT
Start: 2021-11-29 | End: 2021-11-29

## 2021-11-29 RX ORDER — DEXAMETHASONE SODIUM PHOSPHATE 4 MG/ML
INJECTION, SOLUTION INTRA-ARTICULAR; INTRALESIONAL; INTRAMUSCULAR; INTRAVENOUS; SOFT TISSUE PRN
Status: DISCONTINUED | OUTPATIENT
Start: 2021-11-29 | End: 2021-11-29

## 2021-11-29 RX ORDER — MISOPROSTOL 200 UG/1
400 TABLET ORAL
Status: DISCONTINUED | OUTPATIENT
Start: 2021-11-29 | End: 2021-12-02 | Stop reason: HOSPADM

## 2021-11-29 RX ORDER — KETAMINE HYDROCHLORIDE 10 MG/ML
INJECTION INTRAMUSCULAR; INTRAVENOUS PRN
Status: DISCONTINUED | OUTPATIENT
Start: 2021-11-29 | End: 2021-11-29

## 2021-11-29 RX ORDER — DIPHENHYDRAMINE HYDROCHLORIDE 50 MG/ML
25 INJECTION INTRAMUSCULAR; INTRAVENOUS ONCE
Status: COMPLETED | OUTPATIENT
Start: 2021-11-29 | End: 2021-11-29

## 2021-11-29 RX ORDER — DEXTROSE, SODIUM CHLORIDE, SODIUM LACTATE, POTASSIUM CHLORIDE, AND CALCIUM CHLORIDE 5; .6; .31; .03; .02 G/100ML; G/100ML; G/100ML; G/100ML; G/100ML
INJECTION, SOLUTION INTRAVENOUS CONTINUOUS
Status: DISCONTINUED | OUTPATIENT
Start: 2021-11-29 | End: 2021-12-02 | Stop reason: HOSPADM

## 2021-11-29 RX ORDER — KETOROLAC TROMETHAMINE 30 MG/ML
30 INJECTION, SOLUTION INTRAMUSCULAR; INTRAVENOUS EVERY 6 HOURS
Status: DISPENSED | OUTPATIENT
Start: 2021-11-29 | End: 2021-11-30

## 2021-11-29 RX ORDER — METHYLERGONOVINE MALEATE 0.2 MG/ML
200 INJECTION INTRAVENOUS
Status: DISCONTINUED | OUTPATIENT
Start: 2021-11-29 | End: 2021-11-29 | Stop reason: HOSPADM

## 2021-11-29 RX ORDER — PROCHLORPERAZINE MALEATE 10 MG
10 TABLET ORAL EVERY 6 HOURS PRN
Status: DISCONTINUED | OUTPATIENT
Start: 2021-11-29 | End: 2021-12-02 | Stop reason: HOSPADM

## 2021-11-29 RX ORDER — FENTANYL CITRATE 50 UG/ML
25 INJECTION, SOLUTION INTRAMUSCULAR; INTRAVENOUS EVERY 5 MIN PRN
Status: DISCONTINUED | OUTPATIENT
Start: 2021-11-29 | End: 2021-11-29 | Stop reason: HOSPADM

## 2021-11-29 RX ORDER — ACETAMINOPHEN 325 MG/1
975 TABLET ORAL EVERY 6 HOURS
Status: DISCONTINUED | OUTPATIENT
Start: 2021-11-29 | End: 2021-12-02 | Stop reason: HOSPADM

## 2021-11-29 RX ORDER — LIDOCAINE 40 MG/G
CREAM TOPICAL
Status: DISCONTINUED | OUTPATIENT
Start: 2021-11-29 | End: 2021-11-29 | Stop reason: HOSPADM

## 2021-11-29 RX ORDER — OXYCODONE HYDROCHLORIDE 5 MG/1
5 TABLET ORAL EVERY 4 HOURS PRN
Status: DISCONTINUED | OUTPATIENT
Start: 2021-11-29 | End: 2021-11-29 | Stop reason: HOSPADM

## 2021-11-29 RX ORDER — PROPOFOL 10 MG/ML
INJECTION, EMULSION INTRAVENOUS PRN
Status: DISCONTINUED | OUTPATIENT
Start: 2021-11-29 | End: 2021-11-29

## 2021-11-29 RX ORDER — MISOPROSTOL 200 UG/1
800 TABLET ORAL
Status: DISCONTINUED | OUTPATIENT
Start: 2021-11-29 | End: 2021-11-29 | Stop reason: HOSPADM

## 2021-11-29 RX ORDER — OXYTOCIN 10 [USP'U]/ML
10 INJECTION, SOLUTION INTRAMUSCULAR; INTRAVENOUS
Status: DISCONTINUED | OUTPATIENT
Start: 2021-11-29 | End: 2021-11-29 | Stop reason: HOSPADM

## 2021-11-29 RX ORDER — IBUPROFEN 600 MG/1
600 TABLET, FILM COATED ORAL EVERY 6 HOURS PRN
Status: DISCONTINUED | OUTPATIENT
Start: 2021-11-29 | End: 2021-12-02 | Stop reason: HOSPADM

## 2021-11-29 RX ORDER — ACETAMINOPHEN 325 MG/1
975 TABLET ORAL ONCE
Status: COMPLETED | OUTPATIENT
Start: 2021-11-29 | End: 2021-11-29

## 2021-11-29 RX ORDER — SODIUM CHLORIDE, SODIUM LACTATE, POTASSIUM CHLORIDE, CALCIUM CHLORIDE 600; 310; 30; 20 MG/100ML; MG/100ML; MG/100ML; MG/100ML
INJECTION, SOLUTION INTRAVENOUS CONTINUOUS
Status: DISCONTINUED | OUTPATIENT
Start: 2021-11-29 | End: 2021-11-29 | Stop reason: HOSPADM

## 2021-11-29 RX ORDER — HYDROCORTISONE 2.5 %
CREAM (GRAM) TOPICAL 3 TIMES DAILY PRN
Status: DISCONTINUED | OUTPATIENT
Start: 2021-11-29 | End: 2021-12-02 | Stop reason: HOSPADM

## 2021-11-29 RX ORDER — OXYTOCIN/0.9 % SODIUM CHLORIDE 30/500 ML
100-340 PLASTIC BAG, INJECTION (ML) INTRAVENOUS CONTINUOUS PRN
Status: DISCONTINUED | OUTPATIENT
Start: 2021-11-29 | End: 2021-12-02 | Stop reason: HOSPADM

## 2021-11-29 RX ORDER — AMOXICILLIN 250 MG
2 CAPSULE ORAL 2 TIMES DAILY
Status: DISCONTINUED | OUTPATIENT
Start: 2021-11-29 | End: 2021-12-02 | Stop reason: HOSPADM

## 2021-11-29 RX ORDER — CEFAZOLIN SODIUM 2 G/100ML
2 INJECTION, SOLUTION INTRAVENOUS
Status: COMPLETED | OUTPATIENT
Start: 2021-11-29 | End: 2021-11-29

## 2021-11-29 RX ORDER — SODIUM CHLORIDE, SODIUM LACTATE, POTASSIUM CHLORIDE, CALCIUM CHLORIDE 600; 310; 30; 20 MG/100ML; MG/100ML; MG/100ML; MG/100ML
INJECTION, SOLUTION INTRAVENOUS CONTINUOUS
Status: DISCONTINUED | OUTPATIENT
Start: 2021-11-29 | End: 2021-12-02 | Stop reason: HOSPADM

## 2021-11-29 RX ORDER — CARBOPROST TROMETHAMINE 250 UG/ML
250 INJECTION, SOLUTION INTRAMUSCULAR
Status: DISCONTINUED | OUTPATIENT
Start: 2021-11-29 | End: 2021-12-02 | Stop reason: HOSPADM

## 2021-11-29 RX ORDER — CEFAZOLIN SODIUM 2 G/100ML
2 INJECTION, SOLUTION INTRAVENOUS
Status: DISCONTINUED | OUTPATIENT
Start: 2021-11-30 | End: 2021-12-02 | Stop reason: HOSPADM

## 2021-11-29 RX ORDER — MODIFIED LANOLIN
OINTMENT (GRAM) TOPICAL
Status: DISCONTINUED | OUTPATIENT
Start: 2021-11-29 | End: 2021-12-02 | Stop reason: HOSPADM

## 2021-11-29 RX ORDER — ACETAMINOPHEN 325 MG/1
650 TABLET ORAL EVERY 4 HOURS PRN
Status: DISCONTINUED | OUTPATIENT
Start: 2021-12-02 | End: 2021-12-02 | Stop reason: HOSPADM

## 2021-11-29 RX ORDER — MISOPROSTOL 200 UG/1
400 TABLET ORAL
Status: DISCONTINUED | OUTPATIENT
Start: 2021-11-29 | End: 2021-11-29 | Stop reason: HOSPADM

## 2021-11-29 RX ORDER — FENTANYL CITRATE-0.9 % NACL/PF 10 MCG/ML
PLASTIC BAG, INJECTION (ML) INTRAVENOUS PRN
Status: DISCONTINUED | OUTPATIENT
Start: 2021-11-29 | End: 2021-11-29

## 2021-11-29 RX ORDER — SIMETHICONE 80 MG
80 TABLET,CHEWABLE ORAL 4 TIMES DAILY PRN
Status: DISCONTINUED | OUTPATIENT
Start: 2021-11-29 | End: 2021-12-02 | Stop reason: HOSPADM

## 2021-11-29 RX ORDER — NIFEDIPINE 60 MG/1
60 TABLET, EXTENDED RELEASE ORAL DAILY
Status: DISCONTINUED | OUTPATIENT
Start: 2021-11-29 | End: 2021-11-29

## 2021-11-29 RX ORDER — BUPIVACAINE HYDROCHLORIDE 5 MG/ML
INJECTION, SOLUTION EPIDURAL; INTRACAUDAL PRN
Status: DISCONTINUED | OUTPATIENT
Start: 2021-11-29 | End: 2021-11-29

## 2021-11-29 RX ORDER — OXYTOCIN/0.9 % SODIUM CHLORIDE 30/500 ML
340 PLASTIC BAG, INJECTION (ML) INTRAVENOUS CONTINUOUS PRN
Status: COMPLETED | OUTPATIENT
Start: 2021-11-29 | End: 2021-11-29

## 2021-11-29 RX ORDER — PROCHLORPERAZINE 25 MG
25 SUPPOSITORY, RECTAL RECTAL EVERY 12 HOURS PRN
Status: DISCONTINUED | OUTPATIENT
Start: 2021-11-29 | End: 2021-12-02 | Stop reason: HOSPADM

## 2021-11-29 RX ORDER — BISACODYL 10 MG
10 SUPPOSITORY, RECTAL RECTAL DAILY PRN
Status: DISCONTINUED | OUTPATIENT
Start: 2021-12-01 | End: 2021-12-02 | Stop reason: HOSPADM

## 2021-11-29 RX ADMIN — KETAMINE HYDROCHLORIDE 10 MG: 10 INJECTION, SOLUTION INTRAMUSCULAR; INTRAVENOUS at 18:54

## 2021-11-29 RX ADMIN — Medication 100 MCG: at 18:29

## 2021-11-29 RX ADMIN — CEFAZOLIN SODIUM 2 G: 2 INJECTION, SOLUTION INTRAVENOUS at 18:16

## 2021-11-29 RX ADMIN — KETAMINE HYDROCHLORIDE 20 MG: 10 INJECTION, SOLUTION INTRAMUSCULAR; INTRAVENOUS at 18:58

## 2021-11-29 RX ADMIN — ACETAMINOPHEN 975 MG: 325 TABLET ORAL at 17:43

## 2021-11-29 RX ADMIN — PROPOFOL 100 MG: 10 INJECTION, EMULSION INTRAVENOUS at 18:58

## 2021-11-29 RX ADMIN — SODIUM CHLORIDE, POTASSIUM CHLORIDE, SODIUM LACTATE AND CALCIUM CHLORIDE 125 ML/HR: 600; 310; 30; 20 INJECTION, SOLUTION INTRAVENOUS at 11:21

## 2021-11-29 RX ADMIN — Medication 0.2 MG: at 20:00

## 2021-11-29 RX ADMIN — PHENYLEPHRINE HYDROCHLORIDE 200 MCG: 10 INJECTION INTRAVENOUS at 19:05

## 2021-11-29 RX ADMIN — KETAMINE HYDROCHLORIDE 20 MG: 10 INJECTION, SOLUTION INTRAMUSCULAR; INTRAVENOUS at 18:51

## 2021-11-29 RX ADMIN — HYDROMORPHONE HYDROCHLORIDE 0.2 MG: 0.2 INJECTION, SOLUTION INTRAMUSCULAR; INTRAVENOUS; SUBCUTANEOUS at 20:32

## 2021-11-29 RX ADMIN — TRANEXAMIC ACID 1 G: 1 INJECTION, SOLUTION INTRAVENOUS at 18:01

## 2021-11-29 RX ADMIN — ONDANSETRON 4 MG: 2 INJECTION INTRAMUSCULAR; INTRAVENOUS at 18:08

## 2021-11-29 RX ADMIN — Medication 200 MCG: at 19:09

## 2021-11-29 RX ADMIN — BUPIVACAINE HYDROCHLORIDE 1 ML: 5 INJECTION, SOLUTION EPIDURAL; INTRACAUDAL; PERINEURAL at 19:37

## 2021-11-29 RX ADMIN — PROPOFOL 50 MG: 10 INJECTION, EMULSION INTRAVENOUS at 19:27

## 2021-11-29 RX ADMIN — KETOROLAC TROMETHAMINE 30 MG: 30 INJECTION, SOLUTION INTRAMUSCULAR; INTRAVENOUS at 21:39

## 2021-11-29 RX ADMIN — DEXAMETHASONE SODIUM PHOSPHATE 10 MG: 4 INJECTION, SOLUTION INTRA-ARTICULAR; INTRALESIONAL; INTRAMUSCULAR; INTRAVENOUS; SOFT TISSUE at 18:58

## 2021-11-29 RX ADMIN — PHENYLEPHRINE HYDROCHLORIDE 0.2 MCG/KG/MIN: 10 INJECTION INTRAVENOUS at 18:36

## 2021-11-29 RX ADMIN — SODIUM CITRATE AND CITRIC ACID MONOHYDRATE 30 ML: 500; 334 SOLUTION ORAL at 17:43

## 2021-11-29 RX ADMIN — ACETAMINOPHEN 650 MG: 325 TABLET ORAL at 21:46

## 2021-11-29 RX ADMIN — NIFEDIPINE 60 MG: 60 TABLET, FILM COATED, EXTENDED RELEASE ORAL at 21:32

## 2021-11-29 RX ADMIN — BUPIVACAINE HYDROCHLORIDE 1 ML: 5 INJECTION, SOLUTION EPIDURAL; INTRACAUDAL; PERINEURAL at 18:50

## 2021-11-29 RX ADMIN — HYDROMORPHONE HYDROCHLORIDE 0.2 MG: 0.2 INJECTION, SOLUTION INTRAMUSCULAR; INTRAVENOUS; SUBCUTANEOUS at 20:26

## 2021-11-29 RX ADMIN — Medication 1.5 ML: at 02:47

## 2021-11-29 RX ADMIN — Medication 100 MG: at 18:58

## 2021-11-29 RX ADMIN — Medication 200 MCG: at 18:42

## 2021-11-29 RX ADMIN — MIDAZOLAM 2 MG: 1 INJECTION INTRAMUSCULAR; INTRAVENOUS at 18:47

## 2021-11-29 RX ADMIN — DIPHENHYDRAMINE HYDROCHLORIDE 25 MG: 50 INJECTION INTRAMUSCULAR; INTRAVENOUS at 08:35

## 2021-11-29 RX ADMIN — Medication 200 MCG: at 18:38

## 2021-11-29 RX ADMIN — Medication 300 ML/HR: at 18:28

## 2021-11-29 RX ADMIN — BUPIVACAINE HYDROCHLORIDE 1 ML: 5 INJECTION, SOLUTION EPIDURAL; INTRACAUDAL; PERINEURAL at 18:00

## 2021-11-29 RX ADMIN — PHENYLEPHRINE HYDROCHLORIDE 200 MCG: 10 INJECTION INTRAVENOUS at 19:03

## 2021-11-29 RX ADMIN — Medication 150 MCG: at 18:40

## 2021-11-29 RX ADMIN — GLYCOPYRROLATE 0.2 MG: 0.2 INJECTION, SOLUTION INTRAMUSCULAR; INTRAVENOUS at 18:43

## 2021-11-29 ASSESSMENT — ACTIVITIES OF DAILY LIVING (ADL): ADLS_ACUITY_SCORE: 4

## 2021-11-29 NOTE — PROGRESS NOTES
Cervix was 6/80/-1 at 1530  Still 6/80/-1 with large caput at 1645  Discussed the protracted labor and arrest of dilation with Jason.  Recommended proceeding with delivery by primary CS.  They agree.  Risks and benefits discussed and consent obtained to proceed.

## 2021-11-29 NOTE — ANESTHESIA PROCEDURE NOTES
Epidural catheter Procedure Note    Pre-Procedure   Staff -        Anesthesiologist:  Shay Brantley MD       Performed By: anesthesiologist       Location: OB       Procedure Start/Stop Times: 11/28/2021 5:16 PM and 11/28/2021 5:46 PM       Pre-Anesthestic Checklist: patient identified, IV checked, risks and benefits discussed, informed consent, monitors and equipment checked, pre-op evaluation, at physician/surgeon's request and post-op pain management  Timeout:       Correct Patient: Yes        Correct Procedure: Yes        Correct Site: Yes        Correct Position: Yes   Procedure Documentation  Procedure: epidural catheter       Patient Position: sitting       Skin prep: Betadine       Local skin infiltrated with 3 mL of 1% lidocaine.        Insertion Site: L1-2. (midline approach).       Technique: LORT saline        Needle Type: Appifier       Needle Gauge: 18.        Needle Length (Inches): 5        Catheter: 20 G.         Catheter threaded easily.         # of attempts: 2 and  # of redirects:  2    Assessment/Narrative         Paresthesias: Yes (right foot transient).       Test dose of 4 mL lidocaine 1.5% w/ 1:200,000 epinephrine at 17:32 CST.         Test dose negative, 3 minutes after injection, for signs of intravascular, subdural, or intrathecal injection.       Insertion/Infusion Method: LORT saline       No aspiration negative for Heme or CSF via Epidural Catheter.    Medication(s) Administered   2% Lidocaine w/ 1:200K Epi (EPIDURAL), 2 mL  Fentanyl PF (Epidural), 25 mcg  Medication Administration Time: 11/28/2021 5:40 PM     Comments:  Needle was test dosed which was negative.  First catheter aspirated blood.  Second catheter went intrathecally.  Discussed with patient and FOB about headaches and to leave catheter in for as long as she can.

## 2021-11-29 NOTE — PROGRESS NOTES
Comfortable with epidural (which likely has partially spinal placement).  It was rebolused around 0300 with good relief.  Cx 4/80/-1, cervix edematous all around.  Baby asynclitic, likely OP.  IUPC in place, contractions not quite adequate about 150MVU.  Ctxs q3-4min.  Discussed the minimal progress with Jason.  Was 3-4cm at 2045hrs, then 5cm at 0530.  Now 4cm, edematous.   FHTs category 1. Afebrile.  She would really like more time to see if she will progress in labor and have a vaginal delivery.    Will try shut pitocin off for an hour and give IV Benadryl to try to reduce cervical swelling, then  Restart pitocin to reestablish adequate labor.  Also continue to work with position changes.    Discussed that if she doesn't progress, would recommend delivery by CS.

## 2021-11-29 NOTE — PLAN OF CARE
Problem: Delayed Labor Progression (Labor)  Goal: Effective Progression to Delivery  Outcome: Improving     Fadia is resting well with her labor epidural. RN will continue to provide labor support as needed. Anticipate .

## 2021-11-29 NOTE — PROGRESS NOTES
"S. Pt seen and evaluated s/p epidural placement. Feeling comfortable. Placement was intrathecal. Had counseled patient having AROM to help with labor progress and she was agreeable.   Pt had 1 prior severe range blood pressure this afternoon, repeat was mild range  O.  Vital signs:  Temp: 97.6  F (36.4  C) Temp src: Oral BP: (!) 160/89 Pulse: 77   Resp: 18 SpO2: 97 %     Height: 172.7 cm (5' 8\") Weight: 81.2 kg (179 lb)  Estimated body mass index is 27.22 kg/m  as calculated from the following:    Height as of this encounter: 1.727 m (5' 8\").    Weight as of this encounter: 81.2 kg (179 lb).    CE: 3.5/80/-2   Fetal status: cat II d/t late appearing decelerations x 3 following AROM  With a wesly of 60 bpm.   Decelerations resolved with position changes and ephedrine     A/p  Hold pitocin currently until tracing consistently improved, will then restart  Anticipate further cervical change    Christine Henry, DO      "

## 2021-11-29 NOTE — PROGRESS NOTES
Checked for progress.  Has felt some pressure depending upon position.  Cx 5-6/80/-1, still some edema though soft  FHTs category 1.  Afebrile.   Pitocin was off for an hour earlier, then restarted at 4mU, and gradually increased to 10mU. Just increased to 11mU.  MVUs not consistently adequate, depending upon position  Discussed the protracted progress, but there has been some progress since 0830.    She would like to keep going and observe for progress in labor.

## 2021-11-30 LAB
CREAT SERPL-MCNC: 0.93 MG/DL (ref 0.6–1.1)
ERYTHROCYTE [DISTWIDTH] IN BLOOD BY AUTOMATED COUNT: 13.3 % (ref 10–15)
GFR SERPL CREATININE-BSD FRML MDRD: 82 ML/MIN/1.73M2
HCT VFR BLD AUTO: 24.4 % (ref 35–47)
HGB BLD-MCNC: 8 G/DL (ref 11.7–15.7)
MCH RBC QN AUTO: 28.5 PG (ref 26.5–33)
MCHC RBC AUTO-ENTMCNC: 32.8 G/DL (ref 31.5–36.5)
MCV RBC AUTO: 87 FL (ref 78–100)
PLATELET # BLD AUTO: 209 10E3/UL (ref 150–450)
RBC # BLD AUTO: 2.81 10E6/UL (ref 3.8–5.2)
WBC # BLD AUTO: 24.4 10E3/UL (ref 4–11)

## 2021-11-30 PROCEDURE — 120N000001 HC R&B MED SURG/OB

## 2021-11-30 PROCEDURE — 250N000009 HC RX 250

## 2021-11-30 PROCEDURE — 250N000013 HC RX MED GY IP 250 OP 250 PS 637: Performed by: OBSTETRICS & GYNECOLOGY

## 2021-11-30 PROCEDURE — 36415 COLL VENOUS BLD VENIPUNCTURE: CPT | Performed by: OBSTETRICS & GYNECOLOGY

## 2021-11-30 PROCEDURE — 250N000011 HC RX IP 250 OP 636: Performed by: OBSTETRICS & GYNECOLOGY

## 2021-11-30 PROCEDURE — 258N000003 HC RX IP 258 OP 636: Performed by: OBSTETRICS & GYNECOLOGY

## 2021-11-30 PROCEDURE — 250N000013 HC RX MED GY IP 250 OP 250 PS 637: Performed by: ADVANCED PRACTICE MIDWIFE

## 2021-11-30 PROCEDURE — 82565 ASSAY OF CREATININE: CPT | Performed by: OBSTETRICS & GYNECOLOGY

## 2021-11-30 PROCEDURE — 85014 HEMATOCRIT: CPT | Performed by: OBSTETRICS & GYNECOLOGY

## 2021-11-30 RX ORDER — FERROUS SULFATE 325(65) MG
325 TABLET ORAL 2 TIMES DAILY
Status: DISCONTINUED | OUTPATIENT
Start: 2021-11-30 | End: 2021-12-02 | Stop reason: HOSPADM

## 2021-11-30 RX ORDER — LIDOCAINE HYDROCHLORIDE 20 MG/ML
SOLUTION OROPHARYNGEAL
Status: COMPLETED
Start: 2021-11-30 | End: 2021-11-30

## 2021-11-30 RX ADMIN — FERROUS SULFATE TAB 325 MG (65 MG ELEMENTAL FE) 325 MG: 325 (65 FE) TAB at 11:16

## 2021-11-30 RX ADMIN — SENNOSIDES AND DOCUSATE SODIUM 1 TABLET: 50; 8.6 TABLET ORAL at 09:57

## 2021-11-30 RX ADMIN — ACETAMINOPHEN 975 MG: 325 TABLET ORAL at 04:21

## 2021-11-30 RX ADMIN — NIFEDIPINE 60 MG: 60 TABLET, FILM COATED, EXTENDED RELEASE ORAL at 21:54

## 2021-11-30 RX ADMIN — KETOROLAC TROMETHAMINE 30 MG: 30 INJECTION, SOLUTION INTRAMUSCULAR at 04:26

## 2021-11-30 RX ADMIN — KETOROLAC TROMETHAMINE 30 MG: 30 INJECTION, SOLUTION INTRAMUSCULAR at 09:58

## 2021-11-30 RX ADMIN — LIDOCAINE HYDROCHLORIDE 15 ML: 20 SOLUTION ORAL; TOPICAL at 14:51

## 2021-11-30 RX ADMIN — FERROUS SULFATE TAB 325 MG (65 MG ELEMENTAL FE) 325 MG: 325 (65 FE) TAB at 21:41

## 2021-11-30 RX ADMIN — SENNOSIDES AND DOCUSATE SODIUM 1 TABLET: 50; 8.6 TABLET ORAL at 10:09

## 2021-11-30 RX ADMIN — SODIUM CHLORIDE, POTASSIUM CHLORIDE, SODIUM LACTATE AND CALCIUM CHLORIDE: 600; 310; 30; 20 INJECTION, SOLUTION INTRAVENOUS at 01:05

## 2021-11-30 RX ADMIN — ACETAMINOPHEN 975 MG: 325 TABLET ORAL at 21:41

## 2021-11-30 RX ADMIN — SENNOSIDES AND DOCUSATE SODIUM 2 TABLET: 50; 8.6 TABLET ORAL at 21:40

## 2021-11-30 RX ADMIN — SENNOSIDES AND DOCUSATE SODIUM 2 TABLET: 50; 8.6 TABLET ORAL at 01:05

## 2021-11-30 RX ADMIN — OXYCODONE HYDROCHLORIDE 5 MG: 5 TABLET ORAL at 04:26

## 2021-11-30 RX ADMIN — ACETAMINOPHEN 975 MG: 325 TABLET ORAL at 09:58

## 2021-11-30 RX ADMIN — ACETAMINOPHEN 975 MG: 325 TABLET ORAL at 16:42

## 2021-11-30 RX ADMIN — OXYCODONE HYDROCHLORIDE 5 MG: 5 TABLET ORAL at 00:55

## 2021-11-30 RX ADMIN — ENOXAPARIN SODIUM 40 MG: 40 INJECTION SUBCUTANEOUS at 21:41

## 2021-11-30 RX ADMIN — KETOROLAC TROMETHAMINE 30 MG: 30 INJECTION, SOLUTION INTRAMUSCULAR at 16:33

## 2021-11-30 RX ADMIN — OXYCODONE HYDROCHLORIDE 5 MG: 5 TABLET ORAL at 10:09

## 2021-11-30 NOTE — ANESTHESIA POSTPROCEDURE EVALUATION
Patient: Fadia Cota    Procedure: Procedure(s):   SECTION       Diagnosis:Arrest of dilation, delivered, current hospitalization [O62.1]  Diagnosis Additional Information: No value filed.    Anesthesia Type:  Epidural    Note:  Disposition: Inpatient   Postop Pain Control: Uneventful            Sign Out: Well controlled pain   PONV: No   Neuro/Psych: Uneventful            Sign Out: Acceptable/Baseline neuro status   Airway/Respiratory: Uneventful            Sign Out: Acceptable/Baseline resp. status   CV/Hemodynamics: Uneventful            Sign Out: Acceptable CV status; No obvious hypovolemia; No obvious fluid overload   Other NRE: NONE   DID A NON-ROUTINE EVENT OCCUR? No     Epidural-to- Updated ASA: 2      Last vitals:  Vitals Value Taken Time   /80 21   Temp 37.4  C (99.4  F) 21   Pulse 85 21   Resp 35 21   SpO2 98 % 21   Vitals shown include unvalidated device data.    Electronically Signed By: Lester Lugo MD  2021  9:11 PM

## 2021-11-30 NOTE — ANESTHESIA POSTPROCEDURE EVALUATION
Patient: Fadia Cota    Procedure: Procedure(s):   SECTION       Diagnosis:Arrest of dilation, delivered, current hospitalization [O62.1]  Diagnosis Additional Information: No value filed.    Anesthesia Type:  Epidural    Note:  Disposition: Inpatient   Postop Pain Control: Uneventful            Sign Out: Well controlled pain   PONV: No   Neuro/Psych: Uneventful            Sign Out: Acceptable/Baseline neuro status   Airway/Respiratory: Uneventful            Sign Out: Acceptable/Baseline resp. status   CV/Hemodynamics: Uneventful            Sign Out: Acceptable CV status; No obvious hypovolemia; No obvious fluid overload   Other NRE: NONE   DID A NON-ROUTINE EVENT OCCUR?     Event details/Postop Comments:  Pt is sitting up in a chair at bedside, without headache at this time. She denies back pain at this time, and reports full return of strength and sensation to her lower extremities. Given that she is higher risk for PDPH, I advised the patient to request a repeat evaluation by our service if any headache develops.      Epidural-to- Updated ASA: 3      Last vitals:  Vitals Value Taken Time   /80 21   Temp 37.4  C (99.4  F) 21   Pulse 85 21   Resp 18 21   SpO2 98 % 21       Electronically Signed By: Julia Hoffmann MD  2021  9:44 AM

## 2021-11-30 NOTE — PROGRESS NOTES
Summary of patient cares: pt returned from PACU as noted for further  recovery. Rated pain about a 4-5 and used medicine, ice pack to abdomen, and belly binder to assist with pain control. Fundus firm at umbilicus. No drainage on binder. Vitals as noted and reviewed with physician. Critical labs were called to Dr Valiente upon receipt, resulting in further lab orders. IV infusion continued and po hydration encouraged due to UOP and blood loss from cesearean. Reviewed recovery expectations, pain control, breastfeeding support given. Pt taught hand expression and assisted with latching  prior to transfer of cares to postpartum RN at 23

## 2021-11-30 NOTE — ANESTHESIA POSTPROCEDURE EVALUATION
Patient: Fadia Cota    Procedure: Procedure(s):   SECTION       Diagnosis:Arrest of dilation, delivered, current hospitalization [O62.1]  Diagnosis Additional Information: No value filed.    Anesthesia Type:  Epidural    Note:  Disposition: Inpatient   Postop Pain Control: Uneventful            Sign Out: Well controlled pain   PONV: No   Neuro/Psych: Uneventful            Sign Out: Acceptable/Baseline neuro status   Airway/Respiratory: Uneventful            Sign Out: Acceptable/Baseline resp. status   CV/Hemodynamics: Uneventful            Sign Out: Acceptable CV status; No obvious hypovolemia; No obvious fluid overload   Other NRE: NONE   DID A NON-ROUTINE EVENT OCCUR? No     Epidural-to- Updated ASA: 2      Last vitals:  Vitals Value Taken Time   /80 21   Temp 37.4  C (99.4  F) 21   Pulse 85 21   Resp 18 21   SpO2 98 % 21       Electronically Signed By: Lester Lugo MD  2021  6:10 AM

## 2021-11-30 NOTE — ANESTHESIA PROCEDURE NOTES
Airway       Patient location during procedure: OR       Procedure Start/Stop Times: 11/29/2021 7:00 PM  Staff -        Performed By: CRNAIndications and Patient Condition       Indications for airway management: micah-procedural       Induction type:intravenous       Mask difficulty assessment: 0 - not attempted    Final Airway Details       Final airway type: endotracheal airway       Successful airway: ETT - single  Endotracheal Airway Details        ETT size (mm): 7.0       Successful intubation technique: video laryngoscopy       VL Blade Size: Glidescope 3       Grade View of Cords: 1       Adjucts: stylet       Position: Right       Measured from: lips       Secured at (cm): 21       Bite block used: None    Post intubation assessment        Placement verified by: capnometry, equal breath sounds and chest rise        Number of attempts at approach: 1       Number of other approaches attempted: 0       Secured with: silk tape       Ease of procedure: easy       Dentition: Intact

## 2021-11-30 NOTE — OP NOTE
Section Operative Note      Pre-operative Diagnosis: 1.  Intrauterine pregnancy 39 week 2 days gestation  2.  Arrest of dilation at 6cm  3.  Protracted labor  4.  Prolonged ROM  5.  Chronic hypertension    Post-operative Diagnosis: same, delivered    Procedure:  Primary low segment transverse  section    Surgeon:  Lala Valiente M.D.    Assist:  Dr Pike, R1    Anesthesia:  Epidural to general endotracheal    Estimated Blood Loss:  1396cc    Complications:  None. Uterine atony    Specimens: none    Indications for surgery:  Fadia is a 31yr old  admitted 21 at 39+1wks for ripening and IOL for chronic hypertension with good control on Procardia XL 60mg daily. Cx 1/50/-2.  Had cytotec x 10hrs, then cervidil x 12hrs.  Cx 2/70/-2.  Pitocin induction was begun. Progressed into latent labor.  Became uncomfortable with contractions; epidural placed with good relief (thought to be intrathecal placement) at 1730hrs yesterday.  AROM 1745 clear fluid. Cx 3.5/80/-2.  Continued on the pitocin.  Epidural rebolused at 0300 with good relief. IUPC placed and pitocin titrated accordingly. Not quite able to establish regular adequate labor. Cx 5/80/-1 at 0530.  Cx 4/80/-1 and edematous on my exam at 0830.  She wished to continue to try for a vaginal delivery, so had a break from pitocin for an hour and given IV benadryl to try to help the cervical swelling.  Then pitocin restarted and titrated up to establish adequate labor. Cx 5-6cm at 1330.  6cm at 1530, and still 6cm/80/-1 at 1730, with large caput. Also having blood tinged urine. The protracted active phase and the arrest of dilation was discussed with Fadia and her .  Recommended proceeding with delivery by primary CS. They agreed.  The risks and benefits were discussed and consent obtained to proceed.    Findings:  8#6oz viable male delivered from ROP vertex presentation at 1824hrs. AF clear. Bulb suctioned, no nuchal cord. Vigorous,  apgars 9,9.  Placenta manually extracted intact with 3VC. Tubes and ovaries normal bilaterally.  Bladder peritoneum edematous and lower uterine segment somewhat friable. Uterine atony, responded to massage and IV pitocin, and TXA.      [unfilled]  Procedure Details   The patient was taken to the Operating Room on prison, identified as Fadia Cota and the procedure verified as  Delivery. A Time Out was held and the above information confirmed.    After administration of epidural anesthesia, the patient was positioned in the left lateral tilt position and was prepped and draped in the usual sterile fashion. A Pfannenstiel incision was made and carried down sharply through the subcutaneous tissue.  The fascia was incised horizontally, and the rectus abdominis muscles bluntly and sharply dissected away from the fascia superiorly and inferiorly to the pubic symphysis.  The rectus muscles were  in the midline, and the peritoneum was identified.  A vertical peritoneal incision was made and extended superiorly and inferiorly to the upper edge of the bladder.  The vesicouterine peritoneal reflection was incised transversely and the edematous bladder flap was bluntly dissected away from the lower uterine segment.     A horizontal incision was made in the lower uterine segment, and the incision was extended bilaterally in a curvilinear fashion with cephalocaudal traction. The amnoitic fluid was  clear.  The infant was delivered from a ROP vertex presentation at 1824 hrs.  There was no nuchal cord.  Mouth and nares were bulb suctioned and cord doubly clamped and cut after 1 minute delay.  The infant was passed off to the Pediatric team in attendance with findings as noted above.      The placenta was manually extracted, intact, with a 3 vessel cord.  The uterine cavity was wiped free of remaining clot and membrane.  The uterus was exteriorized and the cut edges of the uterine incision were grasped. The lower  segment was somewhat friable from the long labor.  There were no extensions of the uterine incision. There was uterine atony, gradually responding to massage, IV pitocin, IV TXA.  The uterus was closed with a double layer of continuous running locked 0 vicryl, the second imbricating the first.  The uterine incision was inspected and all was hemostatic. She was having quite a bit of discomfort at the end of the uterine repair and with any maniipulation.  The MDA tried rebolusing the epidural and giving more IV medications but still was not effective enough.  Decision was made to convert to general anesthesia.  After induction of general anesthesia and endotracheal intubation, we continued with the surgery.  The Fallopian tubes and ovaries were examined and appeared normal.  The bladder flap was inspected and was hemostatic. The uterus was replaced into the abdominal cavity.  The pericolic gutters were swabbed clean.  The uterine incision was once again inspected once back in its normal anatomic position and was hemostatic. Surgicel powder was placed over the incision and behind the bladder flap.  The parietal peritoneum was closed with continuous running 0 vicryl.  The subfascial space was inspected and hemostasis achieved with electrocautery.  The rectus muscles were lying together in the midline.  The fascia was closed with two pieces of continuous running 0 vicryl, starting at each corner and meeting in the midline.  The subcutaneous tissue was irrigated and hemostasis achieved with electrocautery.  The subcu was reapproximated with simple interrupted 3.0 plain.  Skin edges reapproximated with subcuticular 4.0 monocryl.  The incision was dressed with Exofin and an island dressing.    Estimated blood loss was qbl 1396cc.  Sponge and needle counts were correct.  There were no complications.  The patient tolerated the procedure well. She awakened from the anesthesia without difficulty and was transferred to the  recovery room in good condition.  The infant will be under [facility] status.      Lala Valiente M.D.

## 2021-11-30 NOTE — PLAN OF CARE
Problem: Postoperative Nausea and Vomiting (Postpartum  Delivery)  Goal: Nausea and Vomiting Relief  Outcome: Improving  Intervention: Prevent or Manage Postoperative Nausea and Vomiting  Recent Flowsheet Documentation  Taken 2021 005 by Elizabeth Moscoso RN  Nausea/Vomiting Interventions:   cool cloth applied   sips of clear liquids given     Problem: Pain (Postpartum  Delivery)  Goal: Acceptable Pain Control  Outcome: Improving  Intervention: Prevent or Manage Pain  Recent Flowsheet Documentation  Taken 2021 005 by Elizabeth Moscoso RN  Pain Management Interventions: medication (see MAR)   Pt's vitals and C/section assessment are normal. Pt's lung sounds are clear, abdomen soft, had a regular diet and had 200 ml emesis, N+V resolved without meds. Pt received Toradol 30mg , Tylenol 975 mg and Roxicodone 5 mg for lower abdominal incisional pain. Fundus firm at 2 fingers below umbilicus. Ice pack applied to incisional site for comfort, abdominal binder on, perineum slightly swollen , ice pack applied. Lochia scant. Pericare done, pads changed x 2. Pt dangled at bedside with minimal pain. Mora had good output refer to I&O for details. Writer assisted pt with Breastfeeding.skin to skin done, mom bonding well with baby.FOB involve and very supportive. She is receptive to teaching. Continue with current plan of care , Notify Provider with any concern Elizabeth Moscoso RN

## 2021-12-01 PROCEDURE — 120N000001 HC R&B MED SURG/OB

## 2021-12-01 PROCEDURE — 250N000011 HC RX IP 250 OP 636: Performed by: OBSTETRICS & GYNECOLOGY

## 2021-12-01 PROCEDURE — 250N000013 HC RX MED GY IP 250 OP 250 PS 637: Performed by: OBSTETRICS & GYNECOLOGY

## 2021-12-01 PROCEDURE — 250N000013 HC RX MED GY IP 250 OP 250 PS 637: Performed by: ADVANCED PRACTICE MIDWIFE

## 2021-12-01 RX ADMIN — SENNOSIDES AND DOCUSATE SODIUM 2 TABLET: 50; 8.6 TABLET ORAL at 08:06

## 2021-12-01 RX ADMIN — IBUPROFEN 600 MG: 600 TABLET, FILM COATED ORAL at 16:36

## 2021-12-01 RX ADMIN — ACETAMINOPHEN 975 MG: 325 TABLET ORAL at 05:04

## 2021-12-01 RX ADMIN — OXYCODONE HYDROCHLORIDE 5 MG: 5 TABLET ORAL at 21:33

## 2021-12-01 RX ADMIN — ENOXAPARIN SODIUM 40 MG: 40 INJECTION SUBCUTANEOUS at 21:40

## 2021-12-01 RX ADMIN — IBUPROFEN 600 MG: 600 TABLET, FILM COATED ORAL at 05:07

## 2021-12-01 RX ADMIN — NIFEDIPINE 60 MG: 60 TABLET, FILM COATED, EXTENDED RELEASE ORAL at 21:33

## 2021-12-01 RX ADMIN — ACETAMINOPHEN 975 MG: 325 TABLET ORAL at 10:48

## 2021-12-01 RX ADMIN — IBUPROFEN 600 MG: 600 TABLET, FILM COATED ORAL at 10:48

## 2021-12-01 RX ADMIN — SENNOSIDES AND DOCUSATE SODIUM 1 TABLET: 50; 8.6 TABLET ORAL at 21:34

## 2021-12-01 RX ADMIN — FERROUS SULFATE TAB 325 MG (65 MG ELEMENTAL FE) 325 MG: 325 (65 FE) TAB at 08:06

## 2021-12-01 RX ADMIN — OXYCODONE HYDROCHLORIDE 5 MG: 5 TABLET ORAL at 16:40

## 2021-12-01 RX ADMIN — ACETAMINOPHEN 975 MG: 325 TABLET ORAL at 21:34

## 2021-12-01 RX ADMIN — IBUPROFEN 600 MG: 600 TABLET, FILM COATED ORAL at 21:33

## 2021-12-01 RX ADMIN — OXYCODONE HYDROCHLORIDE 5 MG: 5 TABLET ORAL at 00:04

## 2021-12-01 RX ADMIN — ACETAMINOPHEN 975 MG: 325 TABLET ORAL at 16:36

## 2021-12-01 RX ADMIN — FERROUS SULFATE TAB 325 MG (65 MG ELEMENTAL FE) 325 MG: 325 (65 FE) TAB at 21:49

## 2021-12-01 NOTE — PLAN OF CARE
"  Problem: Adjustment to Role Transition (Postpartum  Delivery)  Goal: Successful Maternal Role Transition  Outcome: Improving     Problem: Bleeding (Postpartum  Delivery)  Goal: Hemostasis  Outcome: Improving     Problem: Infection (Postpartum  Delivery)  Goal: Absence of Infection Signs and Symptoms  Outcome: Improving     Problem: Pain (Postpartum  Delivery)  Goal: Acceptable Pain Control  Outcome: Improving  Intervention: Prevent or Manage Pain  Recent Flowsheet Documentation  Taken 2021 0000 by Kim Lees RN  Pain Management Interventions: medication (see MAR)     Problem: Postoperative Nausea and Vomiting (Postpartum  Delivery)  Goal: Nausea and Vomiting Relief  Outcome: Improving     Problem: Postoperative Urinary Retention (Postpartum  Delivery)  Goal: Effective Urinary Elimination  Outcome: Improving      Pain: Controlled with scheduled and PRN medications    Lochia: Rubra with light output    Fundus: Firm and midline, about 1 cm below U    Voiding: Freely without problems    Feeding baby:  breast and donor milk. Pumping after each breast feeding session.    Family with lots of good questions. Education given, family very receptive to information      /70   Pulse 81   Temp 98.2  F (36.8  C) (Oral)   Resp 18   Ht 1.727 m (5' 8\")   Wt 81.2 kg (179 lb)   LMP 2021   SpO2 99%   Breastfeeding Unknown   BMI 27.22 kg/m         "

## 2021-12-01 NOTE — PROGRESS NOTES
" Delivery Postpartum Day 2      Assessment:   Day 2 postpartum, c/s.  Breastfeeding  Anemia- iron BID  Stable postpartum course.    Plan:    -New mom information reviewed. Discussed micah care, breast care, pain management, breastfeeding initiation, bowel changes, return of fertility, postpartum exercises, postpartum mood changes and adjustments, postpartum blues vs. postpartum depression, sleep changes, required support.     -Plan for today: encouraged rest, skin-to-skin, breastfeeding on cue, adequate pain control, and limiting visitors.     Subjective:  Integrating birth experience. Pleased with her care. The patient is voiding and ambulating without difficulty. Tolerating normal diet. Bleeding is light without clots. Pain is well controlled with current medications. Baby is bresatfeeding on cue.  Support at home identified -family. Patient denies history of depression.     Objective:  /79   Pulse 90   Temp 98.2  F (36.8  C) (Oral)   Resp 16   Ht 1.727 m (5' 8\")   Wt 81.2 kg (179 lb)   LMP 2021   SpO2 90%   Breastfeeding Unknown   BMI 27.22 kg/m    Patient Vitals for the past 24 hrs:   BP Temp Temp src Pulse Resp SpO2   21 0800 126/79 -- -- 90 16 90 %   21 2358 118/70 98.2  F (36.8  C) Oral 81 18 99 %   21 2154 139/84 -- -- -- -- --   21 1845 132/78 97.6  F (36.4  C) Oral 89 16 99 %   21 1730 -- -- -- -- 16 98 %   21 1633 -- -- -- -- 16 98 %   21 1150 120/74 98.4  F (36.9  C) Oral 67 18 96 %       General Appearance:    Alert, NAD   Breast Exam:   Breasts soft, filling,    Abdomen:   Soft, non-tender, 1 cm diastasis; fundus firm @ U/ 1, midline,    non-tender   Perineum:   Tissues well- approximated, trace edema. Lochia mild, rubra,          non-malodorous, without clots.    Legs:     trace edema, denies calf tenderness, no varicosities.   Provider:  Sunni Morton CNM                    [facility]    "

## 2021-12-01 NOTE — PROGRESS NOTES
" Postpartum Day 1    Patient Name:  Fadia Cota  :  1990  MRN:  [id number]  Late entry from 1200    Assessment:  Normal postpartum course. Anemia secondary to acute blood loss    Plan:  Continue current care. Iron BID    Subjective:  The patient feels well:  Has not voided at the time of exam since sullivan removed. lochia normal, tolerating normal diet, and is not yet passing flatus. Pain is well controlled with current medications.  The patient has no emotional concerns.  The baby is well and being fed by breast.    Objective:  /78   Pulse 89   Temp 97.6  F (36.4  C) (Oral)   Resp 16   Ht 1.727 m (5' 8\")   Wt 81.2 kg (179 lb)   LMP 2021   SpO2 99%   Breastfeeding Unknown   BMI 27.22 kg/m    Patient Vitals for the past 24 hrs:   BP Temp Temp src Pulse Resp SpO2   21 1845 132/78 97.6  F (36.4  C) Oral 89 16 99 %   21 1730 -- -- -- -- 16 98 %   21 1633 -- -- -- -- 16 98 %   21 1150 120/74 98.4  F (36.9  C) Oral 67 18 96 %   21 0811 (!) 132/90 97.9  F (36.6  C) Oral 82 18 99 %   21 0650 123/77 -- -- -- -- 97 %   21 0500 -- -- -- -- -- 96 %   21 0445 -- -- -- -- -- 97 %   21 0433 129/85 98.2  F (36.8  C) Axillary 74 16 --   21 0431 -- 97.7  F (36.5  C) Axillary -- 18 --   21 -- -- -- -- -- 97 %   21 0415 -- -- -- -- -- 97 %   21 0400 -- -- -- -- -- 97 %   21 0345 -- -- -- -- -- 97 %   21 0330 122/71 -- -- -- -- 98 %   21 0300 -- -- -- -- -- 98 %   21 0245 -- -- -- -- -- 99 %   21 0230 -- -- -- -- -- 96 %   21 -- -- -- -- -- 96 %   21 0130 (!) 155/77 -- -- -- -- 97 %   21 -- -- -- -- -- 98 %   21 -- -- -- -- -- 99 %   21 -- -- -- -- -- 97 %   21 -- -- -- -- -- 95 %   21 -- -- -- -- -- 98 %   21 -- -- -- -- -- 98 %   21 -- -- -- -- -- 97 %   21 0035 -- -- -- -- -- 97 % "   11/30/21 0030 -- -- -- -- -- 97 %   11/30/21 0025 (!) 146/90 -- -- -- -- 96 %   11/30/21 0020 -- -- -- -- -- 99 %   11/30/21 0015 -- -- -- -- -- 94 %   11/30/21 0010 -- -- -- -- -- 98 %   11/30/21 0005 -- -- -- -- -- 99 %   11/30/21 0000 -- -- -- -- -- 97 %   11/29/21 2355 -- -- -- -- -- 97 %   11/29/21 2350 -- -- -- -- -- 97 %   11/29/21 2345 -- -- -- -- -- 97 %   11/29/21 2340 -- -- -- -- -- 96 %   11/29/21 2335 -- -- -- -- -- 97 %   11/29/21 2331 -- -- -- -- -- 96 %   11/29/21 2326 -- -- -- -- -- 97 %   11/29/21 2321 -- -- -- -- -- 95 %   11/29/21 2316 -- -- -- -- -- 96 %   11/29/21 2311 -- -- -- -- -- 96 %   11/29/21 2309 (!) 145/85 -- -- -- -- --   11/29/21 2306 -- -- -- -- -- 97 %   11/29/21 2304 -- -- -- -- -- 93 %   11/29/21 2301 -- -- -- -- -- 96 %   11/29/21 2256 -- -- -- -- -- 96 %   11/29/21 2254 138/79 -- -- -- -- --   11/29/21 2251 -- -- -- -- -- 97 %   11/29/21 2246 -- -- -- -- -- 96 %   11/29/21 2241 -- -- -- -- -- 95 %   11/29/21 2239 (!) 146/76 -- -- -- -- --   11/29/21 2236 -- -- -- -- -- 95 %   11/29/21 2231 -- -- -- -- -- 96 %   11/29/21 2226 -- -- -- -- -- 96 %   11/29/21 2224 (!) 141/82 -- -- -- -- --   11/29/21 2216 -- -- -- -- 18 --   11/29/21 2211 -- -- -- -- -- 95 %   11/29/21 2209 (!) 150/83 -- -- -- -- --   11/29/21 2206 -- -- -- -- -- 96 %   11/29/21 2201 -- -- -- -- -- 97 %   11/29/21 2156 -- -- -- -- -- 96 %   11/29/21 2151 -- -- -- -- -- 97 %   11/29/21 2146 -- 98  F (36.7  C) -- -- -- 97 %   11/29/21 2141 -- -- -- -- -- 96 %   11/29/21 2138 (!) 152/92 -- -- -- 18 --   11/29/21 2136 -- -- -- -- -- 97 %   11/29/21 2131 -- -- -- -- -- 99 %   11/29/21 2126 -- -- -- -- -- 97 %   11/29/21 2121 (!) 133/91 -- -- -- -- 99 %   11/29/21 2116 138/89 -- -- -- -- 98 %   11/29/21 2111 139/89 -- -- -- -- 97 %   11/29/21 2106 128/89 -- -- -- 18 96 %   11/29/21 2104 -- -- -- -- -- 94 %   11/29/21 2101 -- 98.3  F (36.8  C) Axillary -- -- 97 %   11/29/21 2042 -- -- -- 85 18 98 %   11/29/21 2041 -- --  -- 85 20 99 %   11/29/21 2040 -- -- -- 81 -- 100 %   11/29/21 2039 -- -- -- 74 14 99 %   11/29/21 2038 -- -- -- 76 14 99 %   11/29/21 2037 -- -- -- 85 15 99 %   11/29/21 2032 -- -- -- -- -- 99 %       The amount and color of the lochia is appropriate for the duration of recovery.  The uterine fundus is firm.  Urinary output is adequate.  The incision bandage is clean, dry and intact.    Lab Results   Component Value Date    AS Negative 11/27/2021    HGB 8.0 (L) 11/30/2021       Immunization History   Administered Date(s) Administered     COVID-19,PF,Pfizer (12+ Yrs) 08/16/2021, 09/07/2021     TDAP Vaccine (Adacel) 09/24/2021       Provider:  Kee    Date:  11/30/2021  Time:  8:31 PM

## 2021-12-02 VITALS
RESPIRATION RATE: 16 BRPM | BODY MASS INDEX: 27.13 KG/M2 | HEIGHT: 68 IN | WEIGHT: 179 LBS | OXYGEN SATURATION: 98 % | TEMPERATURE: 98.6 F | SYSTOLIC BLOOD PRESSURE: 135 MMHG | DIASTOLIC BLOOD PRESSURE: 80 MMHG | HEART RATE: 98 BPM

## 2021-12-02 PROCEDURE — 250N000013 HC RX MED GY IP 250 OP 250 PS 637: Performed by: OBSTETRICS & GYNECOLOGY

## 2021-12-02 PROCEDURE — 250N000013 HC RX MED GY IP 250 OP 250 PS 637: Performed by: ADVANCED PRACTICE MIDWIFE

## 2021-12-02 RX ORDER — IBUPROFEN 600 MG/1
600 TABLET, FILM COATED ORAL EVERY 6 HOURS PRN
Qty: 30 TABLET | Refills: 0 | Status: SHIPPED | OUTPATIENT
Start: 2021-12-02 | End: 2022-03-01

## 2021-12-02 RX ORDER — AMOXICILLIN 250 MG
1 CAPSULE ORAL 2 TIMES DAILY
Qty: 30 TABLET | Refills: 0 | Status: SHIPPED | OUTPATIENT
Start: 2021-12-02 | End: 2022-03-01

## 2021-12-02 RX ORDER — OXYCODONE HYDROCHLORIDE 5 MG/1
5 TABLET ORAL EVERY 4 HOURS PRN
Qty: 13 TABLET | Refills: 0 | Status: SHIPPED | OUTPATIENT
Start: 2021-12-02 | End: 2021-12-02

## 2021-12-02 RX ORDER — FERROUS SULFATE 325(65) MG
325 TABLET ORAL 2 TIMES DAILY
Qty: 60 TABLET | Refills: 0 | Status: SHIPPED | OUTPATIENT
Start: 2021-12-02 | End: 2022-03-01

## 2021-12-02 RX ORDER — OXYCODONE HYDROCHLORIDE 5 MG/1
5 TABLET ORAL EVERY 4 HOURS PRN
Qty: 13 TABLET | Refills: 0 | Status: SHIPPED | OUTPATIENT
Start: 2021-12-02 | End: 2022-03-01

## 2021-12-02 RX ORDER — ACETAMINOPHEN 325 MG/1
650 TABLET ORAL EVERY 4 HOURS PRN
COMMUNITY
Start: 2021-12-02 | End: 2022-03-01

## 2021-12-02 RX ADMIN — IBUPROFEN 600 MG: 600 TABLET, FILM COATED ORAL at 12:17

## 2021-12-02 RX ADMIN — OXYCODONE HYDROCHLORIDE 5 MG: 5 TABLET ORAL at 08:57

## 2021-12-02 RX ADMIN — FERROUS SULFATE TAB 325 MG (65 MG ELEMENTAL FE) 325 MG: 325 (65 FE) TAB at 08:47

## 2021-12-02 RX ADMIN — IBUPROFEN 600 MG: 600 TABLET, FILM COATED ORAL at 03:20

## 2021-12-02 RX ADMIN — ACETAMINOPHEN 975 MG: 325 TABLET ORAL at 05:13

## 2021-12-02 RX ADMIN — ACETAMINOPHEN 975 MG: 325 TABLET ORAL at 11:05

## 2021-12-02 RX ADMIN — SENNOSIDES AND DOCUSATE SODIUM 1 TABLET: 50; 8.6 TABLET ORAL at 08:47

## 2021-12-02 RX ADMIN — OXYCODONE HYDROCHLORIDE 5 MG: 5 TABLET ORAL at 13:07

## 2021-12-02 RX ADMIN — OXYCODONE HYDROCHLORIDE 5 MG: 5 TABLET ORAL at 03:21

## 2021-12-02 NOTE — PROGRESS NOTES
"Outreach Note for ZAINA Cota  [id number]  1990    Chart reviewed, discharge plan discussed with patient's bedside RN, needs assessed. Home care visit requested, nurse visit planned for , , Home Care Intake updated. Address and phone number reported as being correct in EMR. Follow-up post-delivery appointments planned in 2 & 6 weeks at [facility].    Patient, Fadia, is reported to have support at home, has baby care essentials, and feels ready to discharge today with , \"Clint Cota\". Outreach RN will continue to follow and assist if needed with discharge plan. No additional needs identified at this time.                "

## 2021-12-02 NOTE — PLAN OF CARE
Patient remains stable with all OB checks. Patient is using advil, tylenol and oxycodone for pain. Patient is voiding and stooled this shift without difficulty. Patient is using and abdominal binder to help with pain. Patients incision is stable with no drainage and covered by steri strips. Patient VS are all within normal limits. Will continue to monitor and update with any changes or concerns.    Vicky Monroe RN

## 2021-12-02 NOTE — PLAN OF CARE
Patient is discharging to home with baby boy and . Patient was stable throughout stay and at the time of discharge. Patient was given prescription for oxycodone to be filled at her own pharmacy for which she chooses. Patient will follow up with provider in the next 2 and 6 weeks. Patient will have a homecare visit schedule for Sunday. Patient was given discharge instruction and education and verbalized understanding of the materials given. Patient will follow up with lactation next Tuesday. No further questions or concerns.    Vicky Monroe RN

## 2021-12-02 NOTE — PLAN OF CARE
Problem: Pain (Postpartum  Delivery)  Goal: Acceptable Pain Control  Outcome: Improving  Intervention: Prevent or Manage Pain  Recent Flowsheet Documentation  Taken 2021 0000 by Elizabeth Moscoso RN  Pain Management Interventions: medication (see MAR)     Problem: Adjustment to Role Transition (Postpartum  Delivery)  Goal: Successful Maternal Role Transition  Outcome: Improving   Pt  Fadia's vitals are stable, pain well managed with scheduled and prn meds per MAR. She was able to ambulate in aguilar-way x 2. Lower extremities edema remain the same. Pt continue to elevate her legs on stool while sitting. She is meeting discharge goals Elizabeth Moscoso, RN

## 2021-12-02 NOTE — DISCHARGE SUMMARY
OB Discharge Summary      Date:  2021    Name:  Fadia Cota  :  1990  MRN:  [id number]      Admission Date:  2021  Delivery Date: 2021  Gestational Age at Delivery:  39w3d  Discharge Date:  2021    Principal Diagnosis:    Patient Active Problem List   Diagnosis     Chronic hypertension affecting pregnancy       Conditions complicating Pregnancy:  Hypertension:  chronic    Procedure(s) Performed:  LTCS    Indication for :  Arrest of dilation  Indication for Induction:  CHTN     Condition at Discharge:  stable    Discharge Medications:      Review of your medicines      START taking      Dose / Directions   acetaminophen 325 MG tablet  Commonly known as: TYLENOL  Used for:  delivery delivered      Dose: 650 mg  Take 2 tablets (650 mg) by mouth every 4 hours as needed for other (multimodal surgical pain management along with NSAIDS and opioid medication as indicated based on pain control and physical function)  Refills: 0     ferrous sulfate 325 (65 Fe) MG tablet  Commonly known as: FEROSUL  Used for:  delivery delivered      Dose: 325 mg  Take 1 tablet (325 mg) by mouth 2 times daily  Quantity: 60 tablet  Refills: 0     ibuprofen 600 MG tablet  Commonly known as: ADVIL/MOTRIN  Used for:  delivery delivered      Dose: 600 mg  Take 1 tablet (600 mg) by mouth every 6 hours as needed for other (cramping)  Quantity: 30 tablet  Refills: 0     oxyCODONE 5 MG tablet  Commonly known as: ROXICODONE  Used for:  delivery delivered      Dose: 5 mg  Take 1 tablet (5 mg) by mouth every 4 hours as needed for pain  Quantity: 13 tablet  Refills: 0     senna-docusate 8.6-50 MG tablet  Commonly known as: SENOKOT-S/PERICOLACE  Used for:  delivery delivered      Dose: 1 tablet  Take 1 tablet by mouth 2 times daily  Quantity: 30 tablet  Refills: 0        CONTINUE these medicines which have NOT CHANGED      Dose / Directions   NIFEdipine ER OSMOTIC 60 MG 24 hr  tablet  Commonly known as: PROCARDIA XL      Dose: 1 tablet  Take 1 tablet by mouth daily  Refills: 0           Where to get your medicines      These medications were sent to [id number] [place] [place]  [facility] [facility] [address]  [address] [place] [address]    Phone: [phone]     ferrous sulfate 325 (65 Fe) MG tablet    ibuprofen 600 MG tablet    senna-docusate 8.6-50 MG tablet     Some of these will need a paper prescription and others can be bought over the counter. Ask your nurse if you have questions.    Bring a paper prescription for each of these medications    oxyCODONE 5 MG tablet  You don't need a prescription for these medications    acetaminophen 325 MG tablet           Discharge Plan:    Follow up with /CNM:  MARQUIS   Patient Instructions:      Physical activity: as tolerated    Diet:  regular    Medication:  As listed above        Physician/CNM:  Torri Collins CNM    Name:  Fadia Cota  :  1990  MRN:  [id number]

## 2021-12-02 NOTE — DISCHARGE INSTRUCTIONS
You have a Home Care nurse visit planned for , . The nurse will contact you to confirm the appointment time. If you do not receive a call by  morning, please call Home Care at [phone]. Please do not schedule a clinic appointment on the same day as home nurse visit.        Postop  Birth Instructions    Activity       Do not lift more than 10 pounds for 6 weeks after surgery.  Ask family and friends for help when you need it.    No driving until you have stopped taking your pain medications (usually two weeks after surgery).    No heavy exercise or activity for 6 weeks.  Don't do anything that will put a strain on your surgery site.    Don't strain when using the toilet.  Your care team may prescribe a stool softener if you have problems with your bowel movements.     To care for your incision:       Keep the incision clean and dry.    Do not soak your incision in water. No swimming or hot tubs until it has fully healed. You may soak in the bathtub if the water level is below your incision.    Do not use peroxide, gel, cream, lotion, or ointment on your incision.    Adjust your clothes to avoid pressure on your surgery site (check the elastic in your underwear for example).     You may see a small amount of clear or pink drainage and this is normal.  Check with your health care provider:       If the drainage increases or has an odor.    If the incision reddens, you have swelling, or develop a rash.    If you have increased pain and the medicine we prescribed doesn't help.    If you have a fever above 100.4 F (38 C) with or without chills when placing thermometer under your tongue.   The area around your incision (surgery wound), will feel numb.  This is normal. The numbness should go away in less than a year.     Keep your hands clean:  Always wash your hands before touching your incision (surgery wound). This helps reduce your risk of infection. If your hands aren't dirty, you may use an  alcohol hand-rub to clean your hands. Keep your nails clean and short.    Call your healthcare provider if you have any of these symptoms:       You soak a sanitary pad with blood within 1 hour, or you see blood clots larger than a golf ball.    Bleeding that lasts more than 6 weeks.    Vaginal discharge that smells bad.    Severe pain, cramping or tenderness in your lower belly area.    A need to urinate more frequently (use the toilet more often), more urgently (use the toilet very quickly), or it burns when you urinate.    Nausea and vomiting.    Redness, swelling or pain around a vein in your leg.    Problems breastfeeding or a red or painful area on your breast.    Chest pain and cough or are gasping for air.    Problems with coping with sadness, anxiety or depression. If you have concerns about hurting yourself or the baby, call your provider immediately.      You have questions or concerns after you return home.

## 2021-12-02 NOTE — ADDENDUM NOTE
Addendum  created 12/02/21 0811 by Shay Brantley MD    Attestation recorded in Intraprocedure, Intraprocedure Attestations filed

## 2021-12-02 NOTE — LACTATION NOTE
Lactation consultant to patient room to assess breastfeeding. Mom highly motivated to breastfeed, and very tearful about birth experience and separation from baby after delivery. Encouragement and support offered.    Mom states that baby has been very sleepy since early am feeding. Infant has been given donor milk through the night after breastfeeding because mom states he was still fussy. Observed baby being gaggy and spitty both times I was in the room.    Reviewed benefit of skin to skin prior to feeding to help get baby ready for feeding, importance of feeding baby on early hunger cues, and breastfeeding 8-12 times in 24 hours for optimal infant nutrition and hydration as well as for building an optimal milk supply.  Education given regarding importance of optimal positioning for deep, comfortable latch and effective milk transfer.     Attempted feeding of infant with mom/baby in cross cradle hold. Infant not showing hunger cues, but eventually latched with rhythmical sucking and swallowing for 4-5 minutes on L side.Instructed on hand expression and breast compression. Mom able to hand express colostrum on R breast and finger feed to infant.     Upon return, instructed on paced bottle feeding and I gave 10 ML of donor milk.    Mom instructed to call lactation for next feeding; recommend starting pumping for stimulation if next attempt unsuccessful.        
Lactation to patient room to review feeding plan for the evening.    Care Plan for Feeding/Latching Concerns    Below are some tips to help build milk supply while working on breastfeeding.    Feed baby at least 8-12 times in 24 hours, as baby cues.    When baby is latched correctly, effective milk transfer will occur.    Signs of effective milk transfer:  hearing swallows, comfortable latch, a contented baby after feedings, meeting output goals, softening of breasts.     Hand expressing and offering expressed milk (via spoon or cup) before or after feedings can increase baby's energy level.    If baby is not transferring milk effectively within 5-10 minutes:    Pump breasts for 15 minutes.  Once mature milk comes in, pump breasts until soft and drained.   An empty breast makes milk.     Feed expressed milk to baby using the amounts below as a guideline, give more as baby cues.  If necessary, make up the difference with donor milk or formula as a bridge until milk supply   increases.    a.  0-24 hours     2-10 ml each feeding  b.  24-48 hours   5-15 ml each feeding  c.  48-72 hours   15-30 ml each feeding  d.  72-96 hours   30-60 ml each feeding   96 hours +      Give more as baby cues    Contact [facility] as needed [phone] 12/2020    Instructed mom on pumping on initiate program. Mom pumped 3-4 ml bilaterally. Answered questions and gave encouragement.  
This note was copied from a baby's chart.  Breast attempt at 1840 with no success. Infant given 2 ml of mother's EBM via a gloved finger, and placed skin to skin. About 45 minutes later, baby was fed remainder of EBM (3 more ml). This writer assisted mom to latch baby on L breast in football hold, educating parents on proper infant positioning, breast sandwiching and need to continue to hold breast throughout feeding until baby is several weeks old, asymmetric latch, and breast compressions. Baby latched well and continued vigorous sucking as long as mom kept up with breast compressions throughout feed. He dozed off after about 25 minutes and did slip into a shallower latch at the end of the feeding, with nipple compression without blanching noted.      Baby was then moved to R breast in cross-cradle, again positioning and latching tips were reviewed, and baby fed for a shorter time (10 minutes) as long as breast compressions were continued. Fadia and Flaquito very attentive to all education and relieved that baby fed better than previously. Infant was offered PDM via paced bottle feeding technique and was reluctant to make seal on bottle nipple, suck improving with a little chin support. He took about 5 ml while Fadia pumped. Also discussed 's second night of life and that frequent waking and feeding is common from 9 pm to 3 am. Parents encouraged to call if infant shows hunger cues and they need PDM.  
This note was copied from a baby's chart.  Lactation to patient room to assist with feeding. Infant moves from sleepy to crying quickly when assisting with feeding. Can achieve latch but inconsistent sucking and no swallows. Infant very gaggy and spitting with clear mucous during feeding.      Feeding plan is for infant to be supplemented after breastfeeding attempts with EBM or donor milk, and for mom to hand express and pump for stimulation until infant able to coordinate suck and maintain latch.  
This note was copied from a baby's chart.  Lactation to room to check in on how breast feeding is progressing. Mom states that infant has been latching and rhythmically sucking, but continued to offer donor milk and pump after feedings.     Mom has questions about feeding plan and supplementing after discharge, so will return after physicians round.  
This note was copied from a baby's chart.  This writer met with Fadia for 45 + minutes this morning, educating, answering several questions and observing infant at the breast.  Education given on hand expression, the importance of optimal positioning for deep, comfortable latch and effective milk transfer, the use of breast compression to assist with milk transfer, listening for swallows, the importance of feeding baby on early hunger cues, and breastfeeding 8-12 times in 24 hours for optimal infant nutrition and hydration as well as for building an optimal milk supply.  She was encouraged to follow up at the [facility]patient [facility] after discharge for any breastfeeding questions or concerns.  She states she wants an outpatient appointment and will call to schedule.  She was able to hand express several drops of colostrum from her breast, but her breasts are soft this morning.  She denies PCOS, infertility, irregular periods and reports she did have breast changes during pregnancy.  She was reassured that her milk will come in but will be delayed, related to Prime, , long labor prior to  and large blood loss.  She has been supplementing infant after every breastfeeding, as infant cues, and then pumping her breasts.  She will continue to do this until her milk supply is established and infant is gaining weight.  Infant had just fed but was cuing for food. This writer took the opportunity to have Fadia latch infant onto the breast.  Fadia does well with latching infant and denies nipple tenderness, however, infant falls asleep quickly at the breast, as her milk is not in yet.  She will supplement infant more donor milk, as infant cues.  Fadia verbalizes understanding of all education given.  She denies any further questions.  She requests this writer to stop by and visit with her again prior to discharge.      
Patient

## 2021-12-02 NOTE — PROGRESS NOTES
" Postpartum Day 3    Patient Name:  Fadia Cota  :  1990  MRN:  [id number]      Assessment:      Day 3 postpartum, c/s.  Breastfeeding  Anemia- iron BID  Stable postpartum course.    Plan:  Continue current care.  Discharge home.  Follow up 2 and 6 weeks.    Subjective:  The patient feels well:  Voiding without difficulty, lochia normal, tolerating normal diet, and passing flatus.  Pain is well controlled with current medications.  The patient has no emotional concerns.  The baby is well.    Objective:  /80 (BP Location: Right arm, Patient Position: Semi-Ng's)   Pulse 98   Temp 98.6  F (37  C) (Oral)   Resp 16   Ht 1.727 m (5' 8\")   Wt 81.2 kg (179 lb)   LMP 2021   SpO2 98%   Breastfeeding Unknown   BMI 27.22 kg/m    Patient Vitals for the past 24 hrs:   BP Temp Temp src Pulse Resp SpO2   21 0700 135/80 98.6  F (37  C) Oral 98 16 98 %   21 0320 133/81 -- -- -- -- --   21 0000 115/71 98.6  F (37  C) Oral 89 16 100 %   21 1640 122/72 98.5  F (36.9  C) Oral 85 18 100 %       The amount and color of the lochia is appropriate for the duration of recovery.  The uterine fundus is firm.  Urinary output is adequate.  The incision is healing well.  The patient is ambulating well.  The patient is tolerating a regular diet.    Lab Results   Component Value Date    AS Negative 2021    HGB 8.0 (L) 2021       Immunization History   Administered Date(s) Administered     COVID-19,PF,Pfizer (12+ Yrs) 2021, 2021     TDAP Vaccine (Adacel) 2021       Provider:  Torri Collins CNM      Date:  2021  Time:  10:23 AM  "

## 2021-12-06 NOTE — PROGRESS NOTES
"Assessment:   1.  Eight day old infant gaining weight well on exclusive breastfeeding:  Nearly returned to birthweight   2.  Good latch, suck and milk transfer in office today  3.  Mother with good milk supply  4. Normal breast engorgement    Plan:   1.  Use good positioning for deep latch, with baby held close to body and baby's head/shoulders/hips in good alignment.  When in a seated position, use a pillow to help bring baby close to breasts, and stepstool to elevate your knees above hips.   2.  To continue to nurse baby on cue, 8-12 times each day.  Feed on one side until baby finishes swallowing.  Once swallowing slows, use breast compression to encourage more swallowing, but once there is no more active swallowing, and baby is either sleeping, coming off the breast, or just \"nibbling,\" it is OK to use a finger to take baby off the breast and move to the other breast.  Do the same on the other side.  Offer both breasts at each feeding.  It is more important to watch the baby than the clock!   3.  If your breast is really full and engorged and it is difficult to get him to latch easily, you can try reverse pressure softening, which is gentle pressure around your areola to just gently soften the area before feeding.    4. You can also ise the breast-lift technique for engorgement: While lying down on your back, use both hands to lift your breasts up off of your chest wall and hold them there for several minutes. This can help the extra fluid drain from your breasts back into your general circulation and relieve the pressure and hardness of your breasts.  5.  Clint is nursing very well and taking a wonderful amount of milk;  He does not need any extra milk from bottles.  You can pump if you need to for comfort, but do not need to do more than that.  6.  Advised that Covid-19 vaccination does provide the baby with antibodies to Covid-19 through breastmilk, and will continue to do so through duration of " breastfeeding.  7.  See pediatric provider as planned, and lactation  as needed.  Opal is great for brief questions, but it's important to know that messages are not seen Friday through . If urgent help is needed, Monday through Friday you can call [phone] and one of our lactation consultants will get the message and respond; if you need a rapid response over a weekend or holiday, it is best to call your on-call maternity or pediatric provider.  Please feel free to schedule a return visit if the concern is more detailed;  telephone visits are also an option if you don't feel you need to be seen in person.    Subjective: Fadia and Flaquito are here today because baby Jack was having difficulty with intake in early days;  Had been supplementing with donor milk and some formula.  She reports that her milk came in two days ago, however, and she is no longer doing regular pumping or supplementing.  Wondering if she should continue pumping, and how to time that.  Fadia is having some engorgement, so occasionally pumps for relief of fullness.  She does note that Jack tends to fall asleep at the breast after 10-15 minutes.  Finally, she will have 6 months off of work, but has some questions about offering bottles and preparing for return to work.    Fadia and her  are both vaccinated for Covid-19 and her  plans his booster soon.    Hospital Course: Induced for HTN on medication with 24h cervical ripening followed by about 16 hours of Pitocin.   for arrest of progress at 6 cm.   complicated by need for conversion to general anesthesia during repair, as well as PPH of 1400 ml.  Baby receiving donor milk due to fussiness after feeding during first night; hospital IBCLC noted infant initially having frequent gagging and spitting up clear mucus, able to latch but having inconsistent swallowing.  Given support for pumping; baby suckling better after first 24h.    Mother's Relevant Med/Surg  History: Chronic HTN    Breast Surgery: no    Breastfeeding Goals:    Previous Breastfeeding Experience: first baby    Infant's name: Clint  Infant's bday: 21  Gestational age: 39w3d  Infant's birth weight: 8 # 5.7 oz    Mode of delivery:   Pediatric Provider: Dr. Christian, [facility].  Fadia gives her permission for today's note to be forwarded to Dr. Christian.  MAYE signed and filed in Fadia's chart as Clint has no local active pediatric chart.    Discharge weight: 8 # 1.3 oz    Frequency and duration of feedings: every 2-3 hours, often taking both sides  Swallows audible per mother: yes  Numbers of feedings in 24 hours: 8-10  Number urines per day: 8  Number of stools per day and their color: 6, mustard yellow    Supplementation: occasionally if baby still cuing to feed after nursing, around 2 - 2 1/2 oz of pumped milk  Pumping: had been pumping with each feeding until milk came in;  Now just twice in last 24 hours, getting 3 - 3 1/2 oz each session.  Uses Haakaa with some feedings, gaining about an ounce.    Objective/Physical exam:   Mother: Noticed breasts grew larger and areolas darkened during pregnancy and she noticed primary engorgement when her milk came in on days 5-6  Her nipples are everted, the areola is compressible, the breast is somewhat engorged and full.     Sore nipples: no  EPDS: not completed    Assessment of infant: 59.65% Weight for age percentile   Age today: 8 days  Today's weight: 8 # 5 oz  Amount of milk transferred from LEFT side: 1.8 oz  Amount of milk transferred from RIGHT side: 1.2 oz    Baby has full flexion of arms and legs, normal tone, behavior is alert and active, respirations are normal, skin is normal, hydration is normal, jaw is normal size and alignment, palate is normal, frenulum is normal, baby can lateralize tongue, has adequate tongue lift, and tongue can protrude past bottom gum line.    Suck exam:  Baby has strong, coordinated suck with good  tongue cupping    Baby thrush: none   Jaundice: none     Feeding assessment: Baby can hold suction with tongue while at the breast.     Alignment: The baby was flex relaxed. Baby's head was aligned with its trunk. Baby did face mother. Baby was in cross cradle position today.   Areolar Grasp: Baby was able to open mouth wide. Baby's lips were not pursed. Baby's lips did flange outward. Tongue was visible over bottom gum. Baby had complete seal.     Areolar Compression: Baby made rhythmic motion. There were no clicking or smacking sounds. There was no severe nipple discomfort. Nipples appeared rounded after feeding.    Audible swallowing: Baby made quiet sounds of swallowing: There was an increase in frequency after milk ejection reflex. The milk ejection reflex is normal and milk supply is normal.     /82 (BP Location: Right arm, Patient Position: Sitting, Cuff Size: Adult Regular)   Pulse 84   LMP 2021   Breastfeeding Yes   OB History    Para Term  AB Living   1 1 1 0 0 1   SAB IAB Ectopic Multiple Live Births   0 0 0 0 1      # Outcome Date GA Lbr Tani/2nd Weight Sex Delivery Anes PTL Lv   1 Term 21 39w3d  3.79 kg (8 lb 5.7 oz) M CS-LTranv IV, EPI N OMAR      Name: NERI MAURICIO-WHITNEY      Apgar1: 9  Apgar5: 9       Current Outpatient Medications:      acetaminophen (TYLENOL) 325 MG tablet, Take 2 tablets (650 mg) by mouth every 4 hours as needed for other (multimodal surgical pain management along with NSAIDS and opioid medication as indicated based on pain control and physical function), Disp: , Rfl:      ferrous sulfate (FEROSUL) 325 (65 Fe) MG tablet, Take 1 tablet (325 mg) by mouth 2 times daily, Disp: 60 tablet, Rfl: 0     ibuprofen (ADVIL/MOTRIN) 600 MG tablet, Take 1 tablet (600 mg) by mouth every 6 hours as needed for other (cramping), Disp: 30 tablet, Rfl: 0     NIFEdipine ER OSMOTIC (PROCARDIA XL) 60 MG 24 hr tablet, Take 1 tablet by mouth daily , Disp: , Rfl:       oxyCODONE (ROXICODONE) 5 MG tablet, Take 1 tablet (5 mg) by mouth every 4 hours as needed for pain, Disp: 13 tablet, Rfl: 0     Prenatal Vit-Fe Fumarate-FA (PRENATAL VITAMIN) 27-0.8 MG TABS, , Disp: , Rfl:      senna-docusate (SENOKOT-S/PERICOLACE) 8.6-50 MG tablet, Take 1 tablet by mouth 2 times daily, Disp: 30 tablet, Rfl: 0  No past medical history on file.  Past Surgical History:   Procedure Laterality Date      SECTION N/A 2021    Procedure:  SECTION;  Surgeon: Lala Valiente MD;  Location: [facility] OR     No family history on file.    Time spent:  Chart review/prechartin min prior to day of service  Face-to-face visit:   63 min   Documentation:  13 min  Total time spent on day of service: 76 min    LIVAN Beltran, CNM, IBCLC

## 2021-12-07 ENCOUNTER — ALLIED HEALTH/NURSE VISIT (OUTPATIENT)
Dept: MIDWIFE SERVICES | Facility: CLINIC | Age: 31
End: 2021-12-07
Payer: COMMERCIAL

## 2021-12-07 VITALS — HEART RATE: 84 BPM | DIASTOLIC BLOOD PRESSURE: 82 MMHG | SYSTOLIC BLOOD PRESSURE: 132 MMHG

## 2021-12-07 DIAGNOSIS — N64.59 ENGORGEMENT OF BREAST: ICD-10-CM

## 2021-12-07 DIAGNOSIS — O92.79 OTHER DISORDERS OF LACTATION: Primary | ICD-10-CM

## 2021-12-07 PROCEDURE — 99215 OFFICE O/P EST HI 40 MIN: CPT | Performed by: ADVANCED PRACTICE MIDWIFE

## 2021-12-07 PROCEDURE — 99417 PROLNG OP E/M EACH 15 MIN: CPT | Performed by: ADVANCED PRACTICE MIDWIFE

## 2021-12-07 RX ORDER — PNV NO.95/FERROUS FUM/FOLIC AC 28MG-0.8MG
TABLET ORAL
COMMUNITY

## 2021-12-07 NOTE — PATIENT INSTRUCTIONS
"  1.  Use good positioning for deep latch, with baby held close to body and baby's head/shoulders/hips in good alignment.  When in a seated position, use a pillow to help bring baby close to breasts, and stepstool to elevate your knees above hips.   2.  To continue to nurse baby on cue, 8-12 times each day.  Feed on one side until baby finishes swallowing.  Once swallowing slows, use breast compression to encourage more swallowing, but once there is no more active swallowing, and baby is either sleeping, coming off the breast, or just \"nibbling,\" it is OK to use a finger to take baby off the breast and move to the other breast.  Do the same on the other side.  Offer both breasts at each feeding.  It is more important to watch the baby than the clock!   3.  If your breast is really full and engorged and it is difficult to get him to latch easily, you can try reverse pressure softening, which is gentle pressure around your areola to just gently soften the area before feeding.    4. You can also ise the breast-lift technique for engorgement: While lying down on your back, use both hands to lift your breasts up off of your chest wall and hold them there for several minutes. This can help the extra fluid drain from your breasts back into your general circulation and relieve the pressure and hardness of your breasts.  5.  Jack is nursing very well and taking a wonderful amount of milk;  He does not need any extra milk from bottles.  You can pump if you need to for comfort, but do not need to do more than that.  6.  See pediatric provider as planned, and lactation  as needed.  MyChart is great for brief questions, but it's important to know that messages are not seen Friday through Sunday. If urgent help is needed, Monday through Friday you can call [phone] and one of our lactation consultants will get the message and respond; if you need a rapid response over a weekend or holiday, it is best to call your on-call " maternity or pediatric provider.  Please feel free to schedule a return visit if the concern is more detailed;  telephone visits are also an option if you don't feel you need to be seen in person.    _________  Engorgement Plan of Care  1. Apply a warm, moist pack (shower, tub or pan of warm water works too) to the breast 2-5 minutes before breastfeeding.  In the rare case you are so engorged that there is no  give  to your breast and your blood vessels are bulging do NOT use heat.  Go directly to using an ice pack.  2. Gently massage your breasts to increase circulation and milk flow - both before and during breastfeeding.  3. Breastfeed your baby frequently (every 1 -3 hours).   4. Hand expression or reverse pressure softening may help just prior to feeding if your areola (Augustine around your nipple) is firm.  Place your fingers on either side of the nipple.  Push gently but firmly straight inward toward your ribs.  Hold the pressure steady for 30-60 seconds.  Repeat with your fingers above and below the nipple.     5. You may also hand express or a pump after a feeding.  Express enough milk to help you feel comfortable.  6. Ice packs after breastfeeding (for about 20 minutes) can be helpful to decrease swelling.  Use of an anti-inflammatory over the counter medication, or as prescribed by your provider, may help to decrease your swelling.    ____________    Good explanation and pictures of reverse pressure softening are here:    https://[employer]/bf/concerns/mother/rev_pressure_soft_lilli/        ---------------------------------------------------------------------------  REVERSE PRESSURE SOFTENING  JUDE Douglas RNC-E, IBCLC, ([email or web address])    What is reverse pressure softening?  It s a new way to soften the areola (the Augustine around your nipple )  to make latching and removing your milk easy while your baby and you are learning.  LATCHING SHOULD NOT BE PAINFUL.  This new method is not the same  as removing milk with your fingers.   Don t expect milk to come from your nipple each time.  But it s OK if some milk does come out.    Why does reverse pressure softening work?  Early swelling, firmness or  fullness  may be only partly due to milk.  Some swelling may be from extra fluid stored (retained) in the spongy, protective  tissue around your milk ducts.  (This extra fluid can never go to your baby.)  Delayed milk removal often leads later to retained tissue fluid. Frequent, regular  removal of small amounts of early milk is best.   Intravenous (IV) fluids, or drugs such as pitocin may often cause early, extra retained  tissue fluid, sometimes taking 7-14 days to go away.  Reverse pressure softening briefly moves mild or firmer swelling away from under your  areola, slightly backward into your breast for a short period of 5-10 minutes.  This allows your areola to change shape very easily, and makes latching easier.  The softened areola helps your nipple extend more deeply into baby s mouth.  reverse pressure softening also causes a  let-down  reflex. (This signals your  breasts to quickly release more milk forward, so baby s tongue can reach it.)  A soft areola also makes it easier to remove milk with fingertips or with    SHORT PERIODS OF SLOW GENTLE PUMPING.  If you need to remove milk for your baby with fingertips or pump, use reverse pressure  softening, whenever needed. You may also gently massage milk forward in the breast.  Avoid long pumping sessions and high vacuum settings on breast pumps to avoid   movement of extra retained tissue fluid into the areola and nipple.    When is reverse pressure softening helpful?   In the first weeks, for firmness of the areola, latch problems or breast swelling.  At any time, to get a  let-down  reflex, before or while pumping.  Feel your areola and the tissue deeper inside it.  Is it soft and easy to squeeze, like your earlobe or your lip?  If not, it s time to  try reverse pressure softening each time just before your baby wants  your breast.  (Some mothers soften their areola before each feeding, for a week or longer, till swelling goes down, latching is deep and easy, and milk is flowing well.  REVERSE PRESSURE SOFTENING SHOULD CAUSE NO DISCOMFORT. CAUTION-NEVER TO BE USED FOR MASTITIS, PLUGGED DUCTS OR ABSCESS    _____________  Good breastfeeding resources:    Websites:  www.[employer]  Https://www.[employer]/blog/    Books:   The Baby Book: Dr. Abraham and Megan Guzmán  The Womanly Art of Breastfeeding by [employer]      For help with using baby carriers:  https://babywearingtwincities.org/

## 2022-01-18 ENCOUNTER — TELEPHONE (OUTPATIENT)
Dept: MIDWIFE SERVICES | Facility: CLINIC | Age: 32
End: 2022-01-18
Payer: COMMERCIAL

## 2022-01-18 NOTE — TELEPHONE ENCOUNTER
Returned Fadia's call--since yesterday has had white dot on her left nipple, which is painful.  Did some Epsom salt with warm water soaks, and did some hand expression/massage,and feels original dot is a bit smaller, but now seeing second area.  Nipple and part of areola are swollen; having pain at beginning of nursing.  Did nurse during phone call, and noted an area of redness after feeding with some bleeding, not at original site of milk bleb;  ? Blood blister.  Was able to release area after feeding, but now appears cracked/open.    Baby is feeding every 3 hours, partially at breast and partially by bottle.  Fadia has been pumping about 4 times/day, partially to relieve fullness and also to have a bottle to offer baby if she has recently pumped--feels that she will not have enough milk to nurse if she has pumped in the last hour.  Now into a cycle of pumping four times/day, releasing about 8 oz/session, feeding by bottle, and then finishing feeding directly at breast. Also nursing once at night as baby is sleeping longer, and she becomes engorged. Would like to minimize this complicated schedule of feeding/pumping.  Ideally would like to pump a little, to have milk for family members to offer to baby occasionally.    A:  Milk bleb left nipple, with additional area of inflammation/cracking;  Milk oversupply    P:  Discussed management of milk blebs (warm soaks before feeding, olive oil on nipples between feedings, lecithin supplement, ibuprofen) and transmitted written info via [employer].  If no improvement, to followup and would consider 0.2% bethamethasone ointment to thin skin over milk bleb and assist in release/healing.  Neosporin to open area on nipple.  Also discussed decreasing milk supply to be in line with baby's needs and prevent further complications of oversupply: reviewed how to wean back on pumping, reassured that she does not need to feed by bottle if she has recently pumped, and discussed option of  "hand-expressing a small amount or doing a \"dream feed\" during the night for fullness if needed.  To nurse baby first, rather than bottlefeeding first.  Fadia indicates understanding;  Info sent via [employer].  "

## 2022-01-18 NOTE — TELEPHONE ENCOUNTER
AO pls call pt - her nipple is swollen and painful - it has a small white dot on it about the size of a pimple but so far pain, redness and swelling is confined to the nipple and areola area, has not spreads to the breast. Tried epsom salts. Pls call to discuss.

## 2022-01-27 DIAGNOSIS — O92.29 MILK BLEB, POSTPARTUM: Primary | ICD-10-CM

## 2022-01-27 DIAGNOSIS — S20.129A MILK BLEB, POSTPARTUM: Primary | ICD-10-CM

## 2022-02-06 ENCOUNTER — HEALTH MAINTENANCE LETTER (OUTPATIENT)
Age: 32
End: 2022-02-06

## 2022-02-27 ASSESSMENT — ENCOUNTER SYMPTOMS
MYALGIAS: 0
SHORTNESS OF BREATH: 0
SORE THROAT: 0
CONSTIPATION: 0
PALPITATIONS: 0
COUGH: 0
CHILLS: 0
DIARRHEA: 0
FREQUENCY: 0
NAUSEA: 0
ARTHRALGIAS: 0
PARESTHESIAS: 0
HEARTBURN: 0
HEMATOCHEZIA: 0
BREAST MASS: 0
NERVOUS/ANXIOUS: 0
DIZZINESS: 0
JOINT SWELLING: 0
HEADACHES: 0
WEAKNESS: 0
ABDOMINAL PAIN: 0
EYE PAIN: 0
HEMATURIA: 0
FEVER: 0
DYSURIA: 0

## 2022-03-01 ENCOUNTER — OFFICE VISIT (OUTPATIENT)
Dept: FAMILY MEDICINE | Facility: CLINIC | Age: 32
End: 2022-03-01
Payer: COMMERCIAL

## 2022-03-01 VITALS
DIASTOLIC BLOOD PRESSURE: 68 MMHG | SYSTOLIC BLOOD PRESSURE: 116 MMHG | OXYGEN SATURATION: 97 % | TEMPERATURE: 97.4 F | WEIGHT: 135 LBS | HEART RATE: 67 BPM | BODY MASS INDEX: 20.53 KG/M2

## 2022-03-01 DIAGNOSIS — Z00.00 ROUTINE GENERAL MEDICAL EXAMINATION AT A HEALTH CARE FACILITY: Primary | ICD-10-CM

## 2022-03-01 DIAGNOSIS — O10.919 CHRONIC HYPERTENSION AFFECTING PREGNANCY: ICD-10-CM

## 2022-03-01 LAB
ANION GAP SERPL CALCULATED.3IONS-SCNC: 3 MMOL/L (ref 3–14)
BUN SERPL-MCNC: 15 MG/DL (ref 7–30)
CALCIUM SERPL-MCNC: 9.7 MG/DL (ref 8.5–10.1)
CHLORIDE BLD-SCNC: 104 MMOL/L (ref 94–109)
CHOLEST SERPL-MCNC: 166 MG/DL
CO2 SERPL-SCNC: 31 MMOL/L (ref 20–32)
CREAT SERPL-MCNC: 0.88 MG/DL (ref 0.52–1.04)
FASTING STATUS PATIENT QL REPORTED: YES
FERRITIN SERPL-MCNC: 8 NG/ML (ref 12–150)
GFR SERPL CREATININE-BSD FRML MDRD: 89 ML/MIN/1.73M2
GLUCOSE BLD-MCNC: 91 MG/DL (ref 70–99)
HDLC SERPL-MCNC: 84 MG/DL
HGB BLD-MCNC: 14.2 G/DL (ref 11.7–15.7)
LDLC SERPL CALC-MCNC: 76 MG/DL
NONHDLC SERPL-MCNC: 82 MG/DL
POTASSIUM BLD-SCNC: 4.5 MMOL/L (ref 3.4–5.3)
SODIUM SERPL-SCNC: 138 MMOL/L (ref 133–144)
TRIGL SERPL-MCNC: 32 MG/DL
TSH SERPL DL<=0.005 MIU/L-ACNC: 0.76 MU/L (ref 0.4–4)

## 2022-03-01 PROCEDURE — 80061 LIPID PANEL: CPT | Performed by: PHYSICIAN ASSISTANT

## 2022-03-01 PROCEDURE — 99385 PREV VISIT NEW AGE 18-39: CPT | Performed by: PHYSICIAN ASSISTANT

## 2022-03-01 PROCEDURE — 80048 BASIC METABOLIC PNL TOTAL CA: CPT | Performed by: PHYSICIAN ASSISTANT

## 2022-03-01 PROCEDURE — 82728 ASSAY OF FERRITIN: CPT | Performed by: PHYSICIAN ASSISTANT

## 2022-03-01 PROCEDURE — 36415 COLL VENOUS BLD VENIPUNCTURE: CPT | Performed by: PHYSICIAN ASSISTANT

## 2022-03-01 PROCEDURE — 84443 ASSAY THYROID STIM HORMONE: CPT | Performed by: PHYSICIAN ASSISTANT

## 2022-03-01 PROCEDURE — 85018 HEMOGLOBIN: CPT | Performed by: PHYSICIAN ASSISTANT

## 2022-03-01 PROCEDURE — 99213 OFFICE O/P EST LOW 20 MIN: CPT | Mod: 25 | Performed by: PHYSICIAN ASSISTANT

## 2022-03-01 RX ORDER — NIFEDIPINE 30 MG/1
30 TABLET, EXTENDED RELEASE ORAL DAILY
Qty: 90 TABLET | Refills: 1 | Status: SHIPPED | OUTPATIENT
Start: 2022-03-01 | End: 2022-03-29

## 2022-03-01 NOTE — PROGRESS NOTES
SUBJECTIVE:   CC: Fadia Cota is an 31 year old woman who presents for preventive health visit.       Patient has been advised of split billing requirements and indicates understanding: Yes  Healthy Habits:     Getting at least 3 servings of Calcium per day:  Yes    Bi-annual eye exam:  Yes    Dental care twice a year:  Yes    Sleep apnea or symptoms of sleep apnea:  None    Diet:  Regular (no restrictions)    Frequency of exercise:  None    Taking medications regularly:  Yes    Medication side effects:  None    PHQ-2 Total Score: 0    Additional concerns today:  Yes    -She has some raised bumps on her legs she would like looked at.     New patient to clinic - here to establish care  Would like to discuss blood pressure  Was diagnosed with high blood pressure BEFORE pregnancy  Because she knew she was planning on getting pregnant, she was started on nifedipine 30mg  Her blood pressures were still running high so she ended up bumping up to 60mg   During pregnancy her blood pressure was well controlled   Pregnancy itself was uncomplicated   Was in labor for 56 hours before stalling and ended up with a C section - ddi have some blood loss during the c section    Does have a family history of high blood pressure in dad and siblings.     Today's PHQ-2 Score:   PHQ-2 ( 1999 [employer]) 2/27/2022   Q1: Little interest or pleasure in doing things 0   Q2: Feeling down, depressed or hopeless 0   PHQ-2 Score 0   Q1: Little interest or pleasure in doing things Not at all   Q2: Feeling down, depressed or hopeless Not at all   PHQ-2 Score 0       Abuse: Current or Past (Physical, Sexual or Emotional) - No  Do you feel safe in your environment? Yes        Social History     Tobacco Use     Smoking status: Never Smoker     Smokeless tobacco: Never Used   Substance Use Topics     Alcohol use: Not Currently         Alcohol Use 2/27/2022   Prescreen: >3 drinks/day or >7 drinks/week? No       Reviewed orders with patient.   Reviewed health maintenance and updated orders accordingly - Yes  BP Readings from Last 3 Encounters:   03/01/22 116/68   12/07/21 132/82   12/02/21 135/80    Wt Readings from Last 3 Encounters:   03/01/22 61.2 kg (135 lb)   11/27/21 81.2 kg (179 lb)                    Breast Cancer Screening:    FHS-7:   Breast CA Risk Assessment (FHS-7) 2/27/2022   Did any of your first-degree relatives have breast or ovarian cancer? No   Did any of your relatives have bilateral breast cancer? No   Did any man in your family have breast cancer? No   Did any woman in your family have breast and ovarian cancer? Yes   Did any woman in your family have breast cancer before age 50 y? No   Do you have 2 or more relatives with breast and/or ovarian cancer? No   Do you have 2 or more relatives with breast and/or bowel cancer? No       History of abnormal Pap smear: NO - age 30-65 PAP every 5 years with negative HPV co-testing recommended  *Patient had PAP in her first trimester of pregnancy (so within the last 1.5 years) - believes it might have been when she was still living in [facility]. Will see if records were transferred over or will request for them if not     Reviewed and updated as needed this visit by clinical staff   Tobacco  Allergies  Meds   Med Hx  Surg Hx  Fam Hx  Soc Hx        Reviewed and updated as needed this visit by Provider                     Review of Systems  CONSTITUTIONAL: NEGATIVE for fever, chills, change in weight  INTEGUMENTARU/SKIN: NEGATIVE for worrisome rashes, moles or lesions  EYES: NEGATIVE for vision changes or irritation  ENT: NEGATIVE for ear, mouth and throat problems  RESP: NEGATIVE for significant cough or SOB  BREAST: NEGATIVE for masses, tenderness or discharge  CV: NEGATIVE for chest pain, palpitations or peripheral edema  GI: NEGATIVE for nausea, abdominal pain, heartburn, or change in bowel habits  : NEGATIVE for unusual urinary or vaginal symptoms. Periods are  regular.  MUSCULOSKELETAL: NEGATIVE for significant arthralgias or myalgia  NEURO: NEGATIVE for weakness, dizziness or paresthesias  PSYCHIATRIC: NEGATIVE for changes in mood or affect     OBJECTIVE:   /68   Pulse 67   Temp 97.4  F (36.3  C) (Tympanic)   Wt 61.2 kg (135 lb)   SpO2 97%   BMI 20.53 kg/m    Physical Exam  GENERAL: healthy, alert and no distress  EYES: Eyes grossly normal to inspection, PERRL and conjunctivae and sclerae normal  HENT: ear canals and TM's normal, nose and mouth without ulcers or lesions  NECK: no adenopathy, no asymmetry, masses, or scars and thyroid normal to palpation  RESP: lungs clear to auscultation - no rales, rhonchi or wheezes  BREAST: declined as she is breastfeeding  CV: regular rate and rhythm, normal S1 S2, no S3 or S4, no murmur, click or rub, no peripheral edema and peripheral pulses strong  ABDOMEN: soft, nontender, no hepatosplenomegaly, no masses and bowel sounds normal  MS: no gross musculoskeletal defects noted, no edema  SKIN: no suspicious lesions or rashes  NEURO: Normal strength and tone, mentation intact and speech normal  PSYCH: mentation appears normal, affect normal/bright    Diagnostic Test Results:  pending    ASSESSMENT/PLAN:   (Z00.00) Routine general medical examination at a health care facility  (primary encounter diagnosis)  Comment:   Plan: Lipid panel reflex to direct LDL Fasting            (O10.919) Chronic hypertension affecting pregnancy  Comment: recheck labs today including TSH as I don't see that we have a baseline. She is wondering if she would be able to eventually get off - unclear given her HTN predated her pregnancy and it sounds liek she has a strong family history but agreeable given she is checking at home that we can wean/taper the dose down and monitor closely  Plan: NIFEdipine ER OSMOTIC (PROCARDIA XL) 30 MG 24         hr tablet, TSH with free T4 reflex, Basic         metabolic panel  (Ca, Cl, CO2, Creat, Gluc, K,          "Na, BUN), Hemoglobin, Ferritin, Lipid panel         reflex to direct LDL Fasting              Patient has been advised of split billing requirements and indicates understanding: Yes    COUNSELING:  Reviewed preventive health counseling, as reflected in patient instructions    Estimated body mass index is 20.53 kg/m  as calculated from the following:    Height as of 11/27/21: 1.727 m (5' 8\").    Weight as of this encounter: 61.2 kg (135 lb).        She reports that she has never smoked. She has never used smokeless tobacco.      Counseling Resources:  ATP IV Guidelines  Pooled Cohorts Equation Calculator  Breast Cancer Risk Calculator  BRCA-Related Cancer Risk Assessment: FHS-7 Tool  FRAX Risk Assessment  ICSI Preventive Guidelines  Dietary Guidelines for Americans, 2010  USDA's MyPlate  ASA Prophylaxis  Lung CA Screening    JUDI Henriquez [facility]  "

## 2022-03-28 ENCOUNTER — MYC MEDICAL ADVICE (OUTPATIENT)
Dept: FAMILY MEDICINE | Facility: CLINIC | Age: 32
End: 2022-03-28
Payer: COMMERCIAL

## 2022-03-28 DIAGNOSIS — O10.919 CHRONIC HYPERTENSION AFFECTING PREGNANCY: Primary | ICD-10-CM

## 2022-04-01 RX ORDER — NIFEDIPINE 60 MG/1
60 TABLET, EXTENDED RELEASE ORAL DAILY
Qty: 90 TABLET | Refills: 1 | Status: SHIPPED | OUTPATIENT
Start: 2022-04-01 | End: 2022-09-06

## 2022-09-03 DIAGNOSIS — O10.919 CHRONIC HYPERTENSION AFFECTING PREGNANCY: ICD-10-CM

## 2022-09-06 RX ORDER — NIFEDIPINE 60 MG/1
TABLET, EXTENDED RELEASE ORAL
Qty: 90 TABLET | Refills: 1 | Status: SHIPPED | OUTPATIENT
Start: 2022-09-06 | End: 2023-03-24

## 2022-10-03 ENCOUNTER — HEALTH MAINTENANCE LETTER (OUTPATIENT)
Age: 32
End: 2022-10-03

## 2023-03-02 ENCOUNTER — TRANSFERRED RECORDS (OUTPATIENT)
Dept: HEALTH INFORMATION MANAGEMENT | Facility: CLINIC | Age: 33
End: 2023-03-02
Payer: COMMERCIAL

## 2023-03-03 ENCOUNTER — TRANSCRIBE ORDERS (OUTPATIENT)
Dept: MATERNAL FETAL MEDICINE | Facility: CLINIC | Age: 33
End: 2023-03-03
Payer: COMMERCIAL

## 2023-03-03 ENCOUNTER — MEDICAL CORRESPONDENCE (OUTPATIENT)
Dept: HEALTH INFORMATION MANAGEMENT | Facility: CLINIC | Age: 33
End: 2023-03-03
Payer: COMMERCIAL

## 2023-03-03 DIAGNOSIS — O26.90 PREGNANCY RELATED CONDITION, ANTEPARTUM: Primary | ICD-10-CM

## 2023-03-03 LAB
ABO (EXTERNAL): NORMAL
HEMOGLOBIN (EXTERNAL): 14.1 G/DL (ref 11.7–15.5)
HEPATITIS B SURFACE ANTIGEN (EXTERNAL): NONREACTIVE
HIV1+2 AB SERPL QL IA: NONREACTIVE
PLATELET COUNT (EXTERNAL): 404 10E3/UL (ref 140–400)
RH (EXTERNAL): POSITIVE
RUBELLA ANTIBODY IGG (EXTERNAL): NORMAL
TREPONEMA PALLIDUM ANTIBODY (EXTERNAL): NONREACTIVE

## 2023-05-02 ENCOUNTER — PRE VISIT (OUTPATIENT)
Dept: MATERNAL FETAL MEDICINE | Facility: HOSPITAL | Age: 33
End: 2023-05-02
Payer: COMMERCIAL

## 2023-05-08 ENCOUNTER — ANCILLARY PROCEDURE (OUTPATIENT)
Dept: ULTRASOUND IMAGING | Facility: HOSPITAL | Age: 33
End: 2023-05-08
Attending: ADVANCED PRACTICE MIDWIFE
Payer: COMMERCIAL

## 2023-05-08 ENCOUNTER — OFFICE VISIT (OUTPATIENT)
Dept: MATERNAL FETAL MEDICINE | Facility: HOSPITAL | Age: 33
End: 2023-05-08
Attending: ADVANCED PRACTICE MIDWIFE
Payer: COMMERCIAL

## 2023-05-08 DIAGNOSIS — O26.90 PREGNANCY RELATED CONDITION, ANTEPARTUM: ICD-10-CM

## 2023-05-08 DIAGNOSIS — O16.2 HYPERTENSION AFFECTING PREGNANCY, SECOND TRIMESTER: Primary | ICD-10-CM

## 2023-05-08 PROCEDURE — 99213 OFFICE O/P EST LOW 20 MIN: CPT | Mod: 25 | Performed by: OBSTETRICS & GYNECOLOGY

## 2023-05-08 PROCEDURE — 76811 OB US DETAILED SNGL FETUS: CPT

## 2023-05-08 PROCEDURE — 76811 OB US DETAILED SNGL FETUS: CPT | Mod: 26 | Performed by: OBSTETRICS & GYNECOLOGY

## 2023-05-08 NOTE — PROGRESS NOTES
Please see the imaging tab for details of the ultrasound performed today.    Jennifer Marcelo MD  Specialist in Maternal-Fetal Medicine

## 2023-05-20 ENCOUNTER — HEALTH MAINTENANCE LETTER (OUTPATIENT)
Age: 33
End: 2023-05-20

## 2023-09-26 ENCOUNTER — ANESTHESIA EVENT (OUTPATIENT)
Dept: OBGYN | Facility: HOSPITAL | Age: 33
End: 2023-09-26
Payer: COMMERCIAL

## 2023-09-26 ENCOUNTER — ANESTHESIA (OUTPATIENT)
Dept: OBGYN | Facility: HOSPITAL | Age: 33
End: 2023-09-26
Payer: COMMERCIAL

## 2023-09-26 ENCOUNTER — TRANSFERRED RECORDS (OUTPATIENT)
Dept: HEALTH INFORMATION MANAGEMENT | Facility: CLINIC | Age: 33
End: 2023-09-26

## 2023-09-26 ENCOUNTER — HOSPITAL ENCOUNTER (INPATIENT)
Facility: HOSPITAL | Age: 33
LOS: 3 days | Discharge: HOME HEALTH | End: 2023-09-29
Attending: OBSTETRICS & GYNECOLOGY | Admitting: OBSTETRICS & GYNECOLOGY
Payer: COMMERCIAL

## 2023-09-26 DIAGNOSIS — Z98.891 S/P CESAREAN SECTION: ICD-10-CM

## 2023-09-26 DIAGNOSIS — O10.919 CHRONIC HYPERTENSION AFFECTING PREGNANCY: ICD-10-CM

## 2023-09-26 LAB
ABO/RH(D): NORMAL
ALBUMIN MFR UR ELPH: 4.8 MG/DL
ALBUMIN SERPL BCG-MCNC: 3.4 G/DL (ref 3.5–5.2)
ALP SERPL-CCNC: 153 U/L (ref 35–104)
ALT SERPL W P-5'-P-CCNC: 13 U/L (ref 0–50)
ANION GAP SERPL CALCULATED.3IONS-SCNC: 13 MMOL/L (ref 7–15)
ANTIBODY SCREEN: NEGATIVE
APTT PPP: 26 SECONDS (ref 22–38)
AST SERPL W P-5'-P-CCNC: 22 U/L (ref 0–45)
BILIRUB SERPL-MCNC: 0.2 MG/DL
BUN SERPL-MCNC: 8.7 MG/DL (ref 6–20)
CALCIUM SERPL-MCNC: 9.3 MG/DL (ref 8.6–10)
CHLORIDE SERPL-SCNC: 103 MMOL/L (ref 98–107)
CREAT SERPL-MCNC: 0.66 MG/DL (ref 0.51–0.95)
CREAT UR-MCNC: 42.9 MG/DL
DEPRECATED HCO3 PLAS-SCNC: 21 MMOL/L (ref 22–29)
EGFRCR SERPLBLD CKD-EPI 2021: >90 ML/MIN/1.73M2
ERYTHROCYTE [DISTWIDTH] IN BLOOD BY AUTOMATED COUNT: 12.3 % (ref 10–15)
GLUCOSE SERPL-MCNC: 121 MG/DL (ref 70–99)
HCT VFR BLD AUTO: 38.4 % (ref 35–47)
HGB BLD-MCNC: 12.7 G/DL (ref 11.7–15.7)
HOLD SPECIMEN: NORMAL
INR PPP: 0.94 (ref 0.85–1.15)
MCH RBC QN AUTO: 29.6 PG (ref 26.5–33)
MCHC RBC AUTO-ENTMCNC: 33.1 G/DL (ref 31.5–36.5)
MCV RBC AUTO: 90 FL (ref 78–100)
PLATELET # BLD AUTO: 300 10E3/UL (ref 150–450)
POTASSIUM SERPL-SCNC: 4.3 MMOL/L (ref 3.4–5.3)
PROT SERPL-MCNC: 6.8 G/DL (ref 6.4–8.3)
PROT/CREAT 24H UR: 0.11 MG/MG CR (ref 0–0.2)
RBC # BLD AUTO: 4.29 10E6/UL (ref 3.8–5.2)
SODIUM SERPL-SCNC: 137 MMOL/L (ref 135–145)
SPECIMEN EXPIRATION DATE: NORMAL
WBC # BLD AUTO: 10.9 10E3/UL (ref 4–11)

## 2023-09-26 PROCEDURE — C9290 INJ, BUPIVACAINE LIPOSOME: HCPCS | Performed by: ANESTHESIOLOGY

## 2023-09-26 PROCEDURE — 258N000003 HC RX IP 258 OP 636: Performed by: NURSE ANESTHETIST, CERTIFIED REGISTERED

## 2023-09-26 PROCEDURE — 258N000003 HC RX IP 258 OP 636: Performed by: OBSTETRICS & GYNECOLOGY

## 2023-09-26 PROCEDURE — 370N000017 HC ANESTHESIA TECHNICAL FEE, PER MIN: Performed by: OBSTETRICS & GYNECOLOGY

## 2023-09-26 PROCEDURE — 250N000011 HC RX IP 250 OP 636: Mod: JZ | Performed by: NURSE ANESTHETIST, CERTIFIED REGISTERED

## 2023-09-26 PROCEDURE — 86900 BLOOD TYPING SEROLOGIC ABO: CPT | Performed by: OBSTETRICS & GYNECOLOGY

## 2023-09-26 PROCEDURE — 85610 PROTHROMBIN TIME: CPT | Performed by: OBSTETRICS & GYNECOLOGY

## 2023-09-26 PROCEDURE — 250N000009 HC RX 250: Performed by: NURSE ANESTHETIST, CERTIFIED REGISTERED

## 2023-09-26 PROCEDURE — 80053 COMPREHEN METABOLIC PANEL: CPT | Performed by: OBSTETRICS & GYNECOLOGY

## 2023-09-26 PROCEDURE — 999N000249 HC STATISTIC C-SECTION ON UNIT

## 2023-09-26 PROCEDURE — 84156 ASSAY OF PROTEIN URINE: CPT | Performed by: OBSTETRICS & GYNECOLOGY

## 2023-09-26 PROCEDURE — 360N000076 HC SURGERY LEVEL 3, PER MIN: Performed by: OBSTETRICS & GYNECOLOGY

## 2023-09-26 PROCEDURE — 36415 COLL VENOUS BLD VENIPUNCTURE: CPT | Performed by: OBSTETRICS & GYNECOLOGY

## 2023-09-26 PROCEDURE — 85027 COMPLETE CBC AUTOMATED: CPT | Performed by: OBSTETRICS & GYNECOLOGY

## 2023-09-26 PROCEDURE — 250N000011 HC RX IP 250 OP 636: Mod: JZ | Performed by: OBSTETRICS & GYNECOLOGY

## 2023-09-26 PROCEDURE — 86780 TREPONEMA PALLIDUM: CPT | Performed by: OBSTETRICS & GYNECOLOGY

## 2023-09-26 PROCEDURE — 250N000013 HC RX MED GY IP 250 OP 250 PS 637: Performed by: OBSTETRICS & GYNECOLOGY

## 2023-09-26 PROCEDURE — 272N000001 HC OR GENERAL SUPPLY STERILE: Performed by: OBSTETRICS & GYNECOLOGY

## 2023-09-26 PROCEDURE — 85730 THROMBOPLASTIN TIME PARTIAL: CPT | Performed by: OBSTETRICS & GYNECOLOGY

## 2023-09-26 PROCEDURE — 250N000011 HC RX IP 250 OP 636: Performed by: OBSTETRICS & GYNECOLOGY

## 2023-09-26 PROCEDURE — 120N000001 HC R&B MED SURG/OB

## 2023-09-26 PROCEDURE — 250N000011 HC RX IP 250 OP 636: Mod: JZ | Performed by: ANESTHESIOLOGY

## 2023-09-26 RX ORDER — NALOXONE HYDROCHLORIDE 0.4 MG/ML
0.2 INJECTION, SOLUTION INTRAMUSCULAR; INTRAVENOUS; SUBCUTANEOUS
Status: DISCONTINUED | OUTPATIENT
Start: 2023-09-26 | End: 2023-09-29 | Stop reason: HOSPADM

## 2023-09-26 RX ORDER — AMOXICILLIN 250 MG
1 CAPSULE ORAL 2 TIMES DAILY
Status: DISCONTINUED | OUTPATIENT
Start: 2023-09-26 | End: 2023-09-29 | Stop reason: HOSPADM

## 2023-09-26 RX ORDER — FENTANYL CITRATE 50 UG/ML
25 INJECTION, SOLUTION INTRAMUSCULAR; INTRAVENOUS EVERY 5 MIN PRN
Status: DISCONTINUED | OUTPATIENT
Start: 2023-09-26 | End: 2023-09-29 | Stop reason: HOSPADM

## 2023-09-26 RX ORDER — ACETAMINOPHEN 325 MG/1
975 TABLET ORAL ONCE
Status: COMPLETED | OUTPATIENT
Start: 2023-09-26 | End: 2023-09-26

## 2023-09-26 RX ORDER — ACETAMINOPHEN 325 MG/1
975 TABLET ORAL EVERY 6 HOURS
Status: DISCONTINUED | OUTPATIENT
Start: 2023-09-27 | End: 2023-09-29 | Stop reason: HOSPADM

## 2023-09-26 RX ORDER — HYDRALAZINE HYDROCHLORIDE 20 MG/ML
10 INJECTION INTRAMUSCULAR; INTRAVENOUS
Status: DISCONTINUED | OUTPATIENT
Start: 2023-09-26 | End: 2023-09-29 | Stop reason: HOSPADM

## 2023-09-26 RX ORDER — MODIFIED LANOLIN
OINTMENT (GRAM) TOPICAL
Status: DISCONTINUED | OUTPATIENT
Start: 2023-09-26 | End: 2023-09-29 | Stop reason: HOSPADM

## 2023-09-26 RX ORDER — DIPHENHYDRAMINE HCL 25 MG
25 CAPSULE ORAL EVERY 6 HOURS PRN
Status: DISCONTINUED | OUTPATIENT
Start: 2023-09-26 | End: 2023-09-29 | Stop reason: HOSPADM

## 2023-09-26 RX ORDER — LIDOCAINE 40 MG/G
CREAM TOPICAL
Status: DISCONTINUED | OUTPATIENT
Start: 2023-09-26 | End: 2023-09-29 | Stop reason: HOSPADM

## 2023-09-26 RX ORDER — HYDROCORTISONE 25 MG/G
CREAM TOPICAL 3 TIMES DAILY PRN
Status: DISCONTINUED | OUTPATIENT
Start: 2023-09-26 | End: 2023-09-29 | Stop reason: HOSPADM

## 2023-09-26 RX ORDER — METHYLERGONOVINE MALEATE 0.2 MG/ML
200 INJECTION INTRAVENOUS
Status: DISCONTINUED | OUTPATIENT
Start: 2023-09-26 | End: 2023-09-29 | Stop reason: HOSPADM

## 2023-09-26 RX ORDER — OXYTOCIN/0.9 % SODIUM CHLORIDE 30/500 ML
100-340 PLASTIC BAG, INJECTION (ML) INTRAVENOUS CONTINUOUS PRN
Status: DISCONTINUED | OUTPATIENT
Start: 2023-09-26 | End: 2023-09-29 | Stop reason: HOSPADM

## 2023-09-26 RX ORDER — OXYTOCIN 10 [USP'U]/ML
10 INJECTION, SOLUTION INTRAMUSCULAR; INTRAVENOUS
Status: DISCONTINUED | OUTPATIENT
Start: 2023-09-26 | End: 2023-09-26 | Stop reason: HOSPADM

## 2023-09-26 RX ORDER — MAGNESIUM SULFATE IN WATER 40 MG/ML
2 INJECTION, SOLUTION INTRAVENOUS CONTINUOUS
Status: DISCONTINUED | OUTPATIENT
Start: 2023-09-26 | End: 2023-09-29 | Stop reason: HOSPADM

## 2023-09-26 RX ORDER — SODIUM CHLORIDE, SODIUM LACTATE, POTASSIUM CHLORIDE, CALCIUM CHLORIDE 600; 310; 30; 20 MG/100ML; MG/100ML; MG/100ML; MG/100ML
10-125 INJECTION, SOLUTION INTRAVENOUS CONTINUOUS
Status: DISCONTINUED | OUTPATIENT
Start: 2023-09-26 | End: 2023-09-29 | Stop reason: HOSPADM

## 2023-09-26 RX ORDER — DEXTROSE, SODIUM CHLORIDE, SODIUM LACTATE, POTASSIUM CHLORIDE, AND CALCIUM CHLORIDE 5; .6; .31; .03; .02 G/100ML; G/100ML; G/100ML; G/100ML; G/100ML
INJECTION, SOLUTION INTRAVENOUS CONTINUOUS
Status: DISCONTINUED | OUTPATIENT
Start: 2023-09-26 | End: 2023-09-29 | Stop reason: HOSPADM

## 2023-09-26 RX ORDER — ONDANSETRON 4 MG/1
4 TABLET, ORALLY DISINTEGRATING ORAL EVERY 6 HOURS PRN
Status: DISCONTINUED | OUTPATIENT
Start: 2023-09-26 | End: 2023-09-29 | Stop reason: HOSPADM

## 2023-09-26 RX ORDER — BUPIVACAINE HYDROCHLORIDE 2.5 MG/ML
INJECTION, SOLUTION EPIDURAL; INFILTRATION; INTRACAUDAL
Status: DISCONTINUED | OUTPATIENT
Start: 2023-09-26 | End: 2023-09-26

## 2023-09-26 RX ORDER — OXYCODONE HYDROCHLORIDE 5 MG/1
5 TABLET ORAL EVERY 4 HOURS PRN
Status: DISCONTINUED | OUTPATIENT
Start: 2023-09-26 | End: 2023-09-29 | Stop reason: HOSPADM

## 2023-09-26 RX ORDER — MAGNESIUM SULFATE HEPTAHYDRATE 40 MG/ML
2 INJECTION, SOLUTION INTRAVENOUS
Status: DISCONTINUED | OUTPATIENT
Start: 2023-09-26 | End: 2023-09-29 | Stop reason: HOSPADM

## 2023-09-26 RX ORDER — METHYLERGONOVINE MALEATE 0.2 MG/ML
200 INJECTION INTRAVENOUS
Status: DISCONTINUED | OUTPATIENT
Start: 2023-09-26 | End: 2023-09-26 | Stop reason: HOSPADM

## 2023-09-26 RX ORDER — ASPIRIN 81 MG/1
81 TABLET ORAL DAILY
COMMUNITY

## 2023-09-26 RX ORDER — METOCLOPRAMIDE 10 MG/1
10 TABLET ORAL EVERY 6 HOURS PRN
Status: DISCONTINUED | OUTPATIENT
Start: 2023-09-26 | End: 2023-09-29 | Stop reason: HOSPADM

## 2023-09-26 RX ORDER — LIDOCAINE 40 MG/G
CREAM TOPICAL
Status: DISCONTINUED | OUTPATIENT
Start: 2023-09-26 | End: 2023-09-26 | Stop reason: HOSPADM

## 2023-09-26 RX ORDER — ONDANSETRON 2 MG/ML
4 INJECTION INTRAMUSCULAR; INTRAVENOUS EVERY 6 HOURS PRN
Status: DISCONTINUED | OUTPATIENT
Start: 2023-09-26 | End: 2023-09-29 | Stop reason: HOSPADM

## 2023-09-26 RX ORDER — AMOXICILLIN 250 MG
2 CAPSULE ORAL 2 TIMES DAILY
Status: DISCONTINUED | OUTPATIENT
Start: 2023-09-26 | End: 2023-09-29 | Stop reason: HOSPADM

## 2023-09-26 RX ORDER — HYDROMORPHONE HCL IN WATER/PF 6 MG/30 ML
0.2 PATIENT CONTROLLED ANALGESIA SYRINGE INTRAVENOUS EVERY 5 MIN PRN
Status: DISCONTINUED | OUTPATIENT
Start: 2023-09-26 | End: 2023-09-29 | Stop reason: HOSPADM

## 2023-09-26 RX ORDER — NALOXONE HYDROCHLORIDE 0.4 MG/ML
0.4 INJECTION, SOLUTION INTRAMUSCULAR; INTRAVENOUS; SUBCUTANEOUS
Status: DISCONTINUED | OUTPATIENT
Start: 2023-09-26 | End: 2023-09-29 | Stop reason: HOSPADM

## 2023-09-26 RX ORDER — CARBOPROST TROMETHAMINE 250 UG/ML
250 INJECTION, SOLUTION INTRAMUSCULAR
Status: DISCONTINUED | OUTPATIENT
Start: 2023-09-26 | End: 2023-09-29 | Stop reason: HOSPADM

## 2023-09-26 RX ORDER — ONDANSETRON 4 MG/1
4 TABLET, ORALLY DISINTEGRATING ORAL EVERY 30 MIN PRN
Status: DISCONTINUED | OUTPATIENT
Start: 2023-09-26 | End: 2023-09-29 | Stop reason: HOSPADM

## 2023-09-26 RX ORDER — MISOPROSTOL 200 UG/1
400 TABLET ORAL
Status: DISCONTINUED | OUTPATIENT
Start: 2023-09-26 | End: 2023-09-29 | Stop reason: HOSPADM

## 2023-09-26 RX ORDER — SODIUM CHLORIDE, SODIUM LACTATE, POTASSIUM CHLORIDE, CALCIUM CHLORIDE 600; 310; 30; 20 MG/100ML; MG/100ML; MG/100ML; MG/100ML
INJECTION, SOLUTION INTRAVENOUS CONTINUOUS
Status: DISCONTINUED | OUTPATIENT
Start: 2023-09-27 | End: 2023-09-29 | Stop reason: HOSPADM

## 2023-09-26 RX ORDER — CEFAZOLIN SODIUM/WATER 2 G/20 ML
2 SYRINGE (ML) INTRAVENOUS
Status: COMPLETED | OUTPATIENT
Start: 2023-09-26 | End: 2023-09-26

## 2023-09-26 RX ORDER — FENTANYL CITRATE 50 UG/ML
25-100 INJECTION, SOLUTION INTRAMUSCULAR; INTRAVENOUS
Status: DISCONTINUED | OUTPATIENT
Start: 2023-09-26 | End: 2023-09-26 | Stop reason: HOSPADM

## 2023-09-26 RX ORDER — BUPIVACAINE HYDROCHLORIDE 7.5 MG/ML
INJECTION, SOLUTION INTRASPINAL
Status: DISCONTINUED | OUTPATIENT
Start: 2023-09-26 | End: 2023-09-26

## 2023-09-26 RX ORDER — MISOPROSTOL 200 UG/1
800 TABLET ORAL
Status: DISCONTINUED | OUTPATIENT
Start: 2023-09-26 | End: 2023-09-29 | Stop reason: HOSPADM

## 2023-09-26 RX ORDER — ONDANSETRON 2 MG/ML
INJECTION INTRAMUSCULAR; INTRAVENOUS PRN
Status: DISCONTINUED | OUTPATIENT
Start: 2023-09-26 | End: 2023-09-26

## 2023-09-26 RX ORDER — METOCLOPRAMIDE HYDROCHLORIDE 5 MG/ML
10 INJECTION INTRAMUSCULAR; INTRAVENOUS EVERY 6 HOURS PRN
Status: DISCONTINUED | OUTPATIENT
Start: 2023-09-26 | End: 2023-09-29 | Stop reason: HOSPADM

## 2023-09-26 RX ORDER — SODIUM CHLORIDE, SODIUM LACTATE, POTASSIUM CHLORIDE, CALCIUM CHLORIDE 600; 310; 30; 20 MG/100ML; MG/100ML; MG/100ML; MG/100ML
INJECTION, SOLUTION INTRAVENOUS CONTINUOUS
Status: DISCONTINUED | OUTPATIENT
Start: 2023-09-26 | End: 2023-09-26 | Stop reason: HOSPADM

## 2023-09-26 RX ORDER — ENOXAPARIN SODIUM 100 MG/ML
40 INJECTION SUBCUTANEOUS AT BEDTIME
Status: DISCONTINUED | OUTPATIENT
Start: 2023-09-27 | End: 2023-09-28

## 2023-09-26 RX ORDER — MISOPROSTOL 200 UG/1
400 TABLET ORAL
Status: DISCONTINUED | OUTPATIENT
Start: 2023-09-26 | End: 2023-09-26 | Stop reason: HOSPADM

## 2023-09-26 RX ORDER — CARBOPROST TROMETHAMINE 250 UG/ML
250 INJECTION, SOLUTION INTRAMUSCULAR
Status: DISCONTINUED | OUTPATIENT
Start: 2023-09-26 | End: 2023-09-26 | Stop reason: HOSPADM

## 2023-09-26 RX ORDER — MORPHINE SULFATE 1 MG/ML
INJECTION, SOLUTION EPIDURAL; INTRATHECAL; INTRAVENOUS
Status: DISCONTINUED | OUTPATIENT
Start: 2023-09-26 | End: 2023-09-26

## 2023-09-26 RX ORDER — OXYTOCIN/0.9 % SODIUM CHLORIDE 30/500 ML
PLASTIC BAG, INJECTION (ML) INTRAVENOUS CONTINUOUS PRN
Status: DISCONTINUED | OUTPATIENT
Start: 2023-09-26 | End: 2023-09-26

## 2023-09-26 RX ORDER — CALCIUM GLUCONATE 94 MG/ML
1 INJECTION, SOLUTION INTRAVENOUS
Status: DISCONTINUED | OUTPATIENT
Start: 2023-09-26 | End: 2023-09-29 | Stop reason: HOSPADM

## 2023-09-26 RX ORDER — MAGNESIUM SULFATE 4 G/50ML
4 INJECTION INTRAVENOUS ONCE
Status: COMPLETED | OUTPATIENT
Start: 2023-09-26 | End: 2023-09-26

## 2023-09-26 RX ORDER — MAGNESIUM SULFATE 4 G/50ML
4 INJECTION INTRAVENOUS
Status: DISCONTINUED | OUTPATIENT
Start: 2023-09-26 | End: 2023-09-29 | Stop reason: HOSPADM

## 2023-09-26 RX ORDER — OXYTOCIN 10 [USP'U]/ML
10 INJECTION, SOLUTION INTRAMUSCULAR; INTRAVENOUS
Status: DISCONTINUED | OUTPATIENT
Start: 2023-09-26 | End: 2023-09-29 | Stop reason: HOSPADM

## 2023-09-26 RX ORDER — OXYTOCIN/0.9 % SODIUM CHLORIDE 30/500 ML
340 PLASTIC BAG, INJECTION (ML) INTRAVENOUS CONTINUOUS PRN
Status: DISCONTINUED | OUTPATIENT
Start: 2023-09-26 | End: 2023-09-26 | Stop reason: HOSPADM

## 2023-09-26 RX ORDER — PROCHLORPERAZINE MALEATE 10 MG
10 TABLET ORAL EVERY 6 HOURS PRN
Status: DISCONTINUED | OUTPATIENT
Start: 2023-09-26 | End: 2023-09-29 | Stop reason: HOSPADM

## 2023-09-26 RX ORDER — ONDANSETRON 2 MG/ML
4 INJECTION INTRAMUSCULAR; INTRAVENOUS EVERY 30 MIN PRN
Status: DISCONTINUED | OUTPATIENT
Start: 2023-09-26 | End: 2023-09-29 | Stop reason: HOSPADM

## 2023-09-26 RX ORDER — OXYTOCIN/0.9 % SODIUM CHLORIDE 30/500 ML
340 PLASTIC BAG, INJECTION (ML) INTRAVENOUS CONTINUOUS PRN
Status: DISCONTINUED | OUTPATIENT
Start: 2023-09-26 | End: 2023-09-29 | Stop reason: HOSPADM

## 2023-09-26 RX ORDER — SIMETHICONE 80 MG
80 TABLET,CHEWABLE ORAL 4 TIMES DAILY PRN
Status: DISCONTINUED | OUTPATIENT
Start: 2023-09-26 | End: 2023-09-29 | Stop reason: HOSPADM

## 2023-09-26 RX ORDER — CEFAZOLIN SODIUM/WATER 2 G/20 ML
2 SYRINGE (ML) INTRAVENOUS SEE ADMIN INSTRUCTIONS
Status: DISCONTINUED | OUTPATIENT
Start: 2023-09-26 | End: 2023-09-26 | Stop reason: HOSPADM

## 2023-09-26 RX ORDER — MISOPROSTOL 200 UG/1
800 TABLET ORAL
Status: DISCONTINUED | OUTPATIENT
Start: 2023-09-26 | End: 2023-09-26 | Stop reason: HOSPADM

## 2023-09-26 RX ORDER — BISACODYL 10 MG
10 SUPPOSITORY, RECTAL RECTAL DAILY PRN
Status: DISCONTINUED | OUTPATIENT
Start: 2023-09-28 | End: 2023-09-29 | Stop reason: HOSPADM

## 2023-09-26 RX ORDER — FENTANYL CITRATE 50 UG/ML
50 INJECTION, SOLUTION INTRAMUSCULAR; INTRAVENOUS EVERY 5 MIN PRN
Status: DISCONTINUED | OUTPATIENT
Start: 2023-09-26 | End: 2023-09-29 | Stop reason: HOSPADM

## 2023-09-26 RX ORDER — DIPHENHYDRAMINE HYDROCHLORIDE 50 MG/ML
25 INJECTION INTRAMUSCULAR; INTRAVENOUS EVERY 6 HOURS PRN
Status: DISCONTINUED | OUTPATIENT
Start: 2023-09-26 | End: 2023-09-29 | Stop reason: HOSPADM

## 2023-09-26 RX ORDER — CITRIC ACID/SODIUM CITRATE 334-500MG
30 SOLUTION, ORAL ORAL
Status: COMPLETED | OUTPATIENT
Start: 2023-09-26 | End: 2023-09-26

## 2023-09-26 RX ORDER — LABETALOL HYDROCHLORIDE 5 MG/ML
20-80 INJECTION, SOLUTION INTRAVENOUS EVERY 10 MIN PRN
Status: DISCONTINUED | OUTPATIENT
Start: 2023-09-26 | End: 2023-09-29 | Stop reason: HOSPADM

## 2023-09-26 RX ORDER — KETOROLAC TROMETHAMINE 30 MG/ML
30 INJECTION, SOLUTION INTRAMUSCULAR; INTRAVENOUS EVERY 6 HOURS
Status: COMPLETED | OUTPATIENT
Start: 2023-09-27 | End: 2023-09-27

## 2023-09-26 RX ORDER — HYDROMORPHONE HCL IN WATER/PF 6 MG/30 ML
0.4 PATIENT CONTROLLED ANALGESIA SYRINGE INTRAVENOUS EVERY 5 MIN PRN
Status: DISCONTINUED | OUTPATIENT
Start: 2023-09-26 | End: 2023-09-29 | Stop reason: HOSPADM

## 2023-09-26 RX ORDER — PROCHLORPERAZINE 25 MG
25 SUPPOSITORY, RECTAL RECTAL EVERY 12 HOURS PRN
Status: DISCONTINUED | OUTPATIENT
Start: 2023-09-26 | End: 2023-09-29 | Stop reason: HOSPADM

## 2023-09-26 RX ORDER — NIFEDIPINE 30 MG
60 TABLET, EXTENDED RELEASE ORAL DAILY
Status: DISCONTINUED | OUTPATIENT
Start: 2023-09-26 | End: 2023-09-29 | Stop reason: HOSPADM

## 2023-09-26 RX ORDER — IBUPROFEN 800 MG/1
800 TABLET, FILM COATED ORAL EVERY 6 HOURS
Status: DISCONTINUED | OUTPATIENT
Start: 2023-09-27 | End: 2023-09-29 | Stop reason: HOSPADM

## 2023-09-26 RX ADMIN — BUPIVACAINE HYDROCHLORIDE IN DEXTROSE 1.6 ML: 7.5 INJECTION, SOLUTION SUBARACHNOID at 20:17

## 2023-09-26 RX ADMIN — LABETALOL HYDROCHLORIDE 20 MG: 5 INJECTION, SOLUTION INTRAVENOUS at 17:48

## 2023-09-26 RX ADMIN — SODIUM CHLORIDE, POTASSIUM CHLORIDE, SODIUM LACTATE AND CALCIUM CHLORIDE: 600; 310; 30; 20 INJECTION, SOLUTION INTRAVENOUS at 20:45

## 2023-09-26 RX ADMIN — BUPIVACAINE HYDROCHLORIDE 20 ML: 2.5 INJECTION, SOLUTION EPIDURAL; INFILTRATION; INTRACAUDAL at 21:28

## 2023-09-26 RX ADMIN — BUPIVACAINE HYDROCHLORIDE 20 ML: 2.5 INJECTION, SOLUTION EPIDURAL; INFILTRATION; INTRACAUDAL at 21:25

## 2023-09-26 RX ADMIN — MAGNESIUM SULFATE HEPTAHYDRATE 2 G/HR: 40 INJECTION, SOLUTION INTRAVENOUS at 18:22

## 2023-09-26 RX ADMIN — Medication 0.15 MG: at 20:17

## 2023-09-26 RX ADMIN — SODIUM CITRATE AND CITRIC ACID MONOHYDRATE 30 ML: 500; 334 SOLUTION ORAL at 18:48

## 2023-09-26 RX ADMIN — ACETAMINOPHEN 975 MG: 325 TABLET ORAL at 18:48

## 2023-09-26 RX ADMIN — Medication 300 ML/HR: at 20:39

## 2023-09-26 RX ADMIN — ONDANSETRON 4 MG: 2 INJECTION INTRAMUSCULAR; INTRAVENOUS at 20:12

## 2023-09-26 RX ADMIN — NIFEDIPINE 60 MG: 30 TABLET, EXTENDED RELEASE ORAL at 22:39

## 2023-09-26 RX ADMIN — BUPIVACAINE 10 ML: 13.3 INJECTION, SUSPENSION, LIPOSOMAL INFILTRATION at 21:28

## 2023-09-26 RX ADMIN — SODIUM CHLORIDE, POTASSIUM CHLORIDE, SODIUM LACTATE AND CALCIUM CHLORIDE 500 ML: 600; 310; 30; 20 INJECTION, SOLUTION INTRAVENOUS at 17:45

## 2023-09-26 RX ADMIN — MAGNESIUM SULFATE HEPTAHYDRATE 4 G: 80 INJECTION, SOLUTION INTRAVENOUS at 17:57

## 2023-09-26 RX ADMIN — PHENYLEPHRINE HYDROCHLORIDE 0.3 MCG/KG/MIN: 10 INJECTION INTRAVENOUS at 20:15

## 2023-09-26 RX ADMIN — Medication 2 G: at 19:54

## 2023-09-26 ASSESSMENT — ACTIVITIES OF DAILY LIVING (ADL)
DIFFICULTY_COMMUNICATING: NO
DRESSING/BATHING_DIFFICULTY: NO
CONCENTRATING,_REMEMBERING_OR_MAKING_DECISIONS_DIFFICULTY: NO
ADLS_ACUITY_SCORE: 20
ADLS_ACUITY_SCORE: 20
TOILETING_ISSUES: NO
DIFFICULTY_EATING/SWALLOWING: NO
DOING_ERRANDS_INDEPENDENTLY_DIFFICULTY: NO
ADLS_ACUITY_SCORE: 20
WEAR_GLASSES_OR_BLIND: YES
ADLS_ACUITY_SCORE: 31
CHANGE_IN_FUNCTIONAL_STATUS_SINCE_ONSET_OF_CURRENT_ILLNESS/INJURY: NO
VISION_MANAGEMENT: CONTACTS
FALL_HISTORY_WITHIN_LAST_SIX_MONTHS: NO
WALKING_OR_CLIMBING_STAIRS_DIFFICULTY: NO

## 2023-09-26 NOTE — ANESTHESIA PREPROCEDURE EVALUATION
Anesthesia Pre-Procedure Evaluation    Patient: Fadia Cota   MRN: [id number] : 1990        Procedure : Procedure(s):  REPEAT  SECTION          History reviewed. No pertinent past medical history.   Past Surgical History:   Procedure Laterality Date     SECTION N/A 2021    Procedure:  SECTION;  Surgeon: Lala Valiente MD;  Location: [facility] OR      No Known Allergies   Social History     Tobacco Use    Smoking status: Never    Smokeless tobacco: Never   Substance Use Topics    Alcohol use: Not Currently      Wt Readings from Last 1 Encounters:   22 61.2 kg (135 lb)        Anesthesia Evaluation   Pt has had prior anesthetic.     No history of anesthetic complications       ROS/MED HX  ENT/Pulmonary:       Neurologic:       Cardiovascular:     (+)  hypertension- -   -  - -                                      METS/Exercise Tolerance:     Hematologic:       Musculoskeletal:       GI/Hepatic:       Renal/Genitourinary:       Endo:       Psychiatric/Substance Use:       Infectious Disease:       Malignancy:       Other:            Physical Exam    Airway        Mallampati: II       Respiratory Devices and Support         Dental       (+) Minor Abnormalities - some fillings, tiny chips      Cardiovascular   cardiovascular exam normal          Pulmonary   pulmonary exam normal                OUTSIDE LABS:  CBC:   Lab Results   Component Value Date    WBC 10.9 2023    WBC 24.4 (H) 2021    HGB 12.7 2023    HGB 14.2 2022    HCT 38.4 2023    HCT 24.4 (L) 2021     2023     2021     BMP:   Lab Results   Component Value Date     2023     2022    POTASSIUM 4.3 2023    POTASSIUM 4.5 2022    CHLORIDE 103 2023    CHLORIDE 104 2022    CO2 21 (L) 2023    CO2 31 2022    BUN 8.7 2023    BUN 15 2022    CR 0.66 2023    CR 0.88 2022      (H) 09/26/2023    GLC 91 03/01/2022     COAGS:   Lab Results   Component Value Date    PTT 26 09/26/2023    INR 0.94 09/26/2023    FIBR 488 11/29/2021     POC: No results found for: BGM, HCG, HCGS  HEPATIC:   Lab Results   Component Value Date    ALBUMIN 3.4 (L) 09/26/2023    PROTTOTAL 6.8 09/26/2023    ALT 13 09/26/2023    AST 22 09/26/2023    ALKPHOS 153 (H) 09/26/2023    BILITOTAL 0.2 09/26/2023     OTHER:   Lab Results   Component Value Date    MARCELLA 9.3 09/26/2023    TSH 0.76 03/01/2022       Anesthesia Plan    ASA Status:  2       Anesthesia Type: Spinal.              Consents    Anesthesia Plan(s) and associated risks, benefits, and realistic alternatives discussed. Questions answered and patient/representative(s) expressed understanding.     - Discussed: Risks, Benefits and Alternatives for BOTH SEDATION and the PROCEDURE were discussed     - Discussed with:  Patient      - Extended Intubation/Ventilatory Support Discussed: No.      - Patient is DNR/DNI Status: No     Use of blood products discussed: No .     Postoperative Care    Pain management: intrathecal morphine, Peripheral nerve block (Single Shot).        Comments:                Imani Domínguez MD

## 2023-09-26 NOTE — H&P
OB ADMISSION H&P - C SECTION     Date: 2023  NAME: Fadia Mauricio   : 1990  MRN: [id number]     CC: elevated BP    HPI: Fadia Mauricio is a 33 year old  with  single inter-uterine gestation at 38w2d, with Estimated Date of Delivery: Oct 8, 2023 . She was sent over from clinic with after having a BPP US that was 6/8 off for breathing. Upon presentation her NST was reactive, however she had 2 severe range blood pressures. Fadia has cHTN and was previously well controlled on nifedipine 60mg XL daily.  section secondary to severe range blood pressures and hx of previous  section. Patient feels well. Has no complaints and reports good fetal movement. Pregnancy has been complicated by cHTN and previous  x1. Patient denies headache, visual changes, RUQ pain. She denies contractions, leakage of fluid, or vaginal bleeding. Please see prenatal records.     OB HISTORY   OB History    Para Term  AB Living   2 1 1 0 0 1   SAB IAB Ectopic Multiple Live Births   0 0 0 0 1      # Outcome Date GA Lbr Tani/2nd Weight Sex Delivery Anes PTL Lv   2 Current            1 Term 21 39w3d  3.79 kg (8 lb 5.7 oz) M CS-LTranv IV, EPI N OMAR      Name: NERI MAURICIO-FADIA      Apgar1: 9  Apgar5: 9       PAST MEDICAL HISTORY  No past medical history on file.     PAST SURGICAL HISTORY:   Past Surgical History:   Procedure Laterality Date     SECTION N/A 2021    Procedure:  SECTION;  Surgeon: Lala Valiente MD;  Location: [facility] OR        SOCIAL HISTORY   Reviewed, patient denies smoking, alcohol, and drug use  She is  . Father is  involved    MEDICATIONS  Current Facility-Administered Medications   Medication    acetaminophen (TYLENOL) tablet 975 mg    calcium gluconate 10 % injection 1 g    carboprost (HEMABATE) injection 250 mcg    ceFAZolin Sodium (ANCEF) injection 2 g    ceFAZolin Sodium (ANCEF) injection 2 g    hydrALAZINE (APRESOLINE)  injection 10 mg    labetalol (NORMODYNE/TRANDATE) injection 20-80 mg    lactated ringers BOLUS 500 mL    lactated ringers infusion    lactated ringers infusion    lidocaine (LMX4) cream    lidocaine (LMX4) cream    lidocaine 1 % 0.1-1 mL    lidocaine 1 % 0.1-1 mL    magnesium sulfate 2 g in 50 mL sterile water intermittent infusion    magnesium sulfate 4 g in 50 mL sterile water intermittent infusion    magnesium sulfate 4 g in 50 mL sterile water intermittent infusion    magnesium sulfate infusion    methylergonovine (METHERGINE) injection 200 mcg    midazolam (VERSED) injection 2 mg    misoprostol (CYTOTEC) tablet 400 mcg    Or    misoprostol (CYTOTEC) tablet 800 mcg    oxytocin (PITOCIN) 30 units in 500 mL 0.9% NaCl infusion    oxytocin (PITOCIN) injection 10 Units    sodium chloride (PF) 0.9% PF flush 3 mL    sodium chloride (PF) 0.9% PF flush 3 mL    sodium chloride (PF) 0.9% PF flush 3 mL    sodium chloride (PF) 0.9% PF flush 3 mL    sodium citrate-citric acid (BICITRA) solution 30 mL    tranexamic acid (CYKLOKAPRON) bolus 1 g vial attach to NaCl 50 or 100 mL bag ADULT       ALLERGIES  No Known Allergies    ROS: otherwise negative except what is stated in HPI.     PHYSICAL EXAM   BP (!) 168/96   Temp 98.4  F (36.9  C) (Oral)   Resp 16   LMP 2023    Gen: no acute distress, resting comfortably   CV: acyanotic   Heart: regular rate and rhythm   Pulm: unlabored respirations, clear to ausculation bilaterally    Abd: gravid, soft, nontender   Extremities: soft, nontender   FHR: positive, category 1  Roe: irregular contractions      LABS  B positive  Rubella immune  HEP B/C neg  RPR nonreactive    Hgb 12.7  Plt 300  AST 22  ALT 13  Cr 0.66  P/c 0.11    IMPRESSION:   33 year old  at 38w2d   single inter uterine pregnancy at term   Pregnancy complications include: cHTN, hx of previous     section secondary to pre eclampsia with severe range blood pressures    PLAN:   - Admit to  hospital  - [facility] labs wnl   - NPO   - Proceed with  section   - anesthesia notified       RISK - C SECTION  Patient counseled on risks, benefits, alternatives and expectations of  section.  Risks detailed to include, but not be limited to:  Pain, bleeding, infection, anesthesia complications, possible injury to bowel, bladder, baby and/or adjacent tissues, possible need for blood transfusion (with 1/50,000 risk of bloodborne pathogen [HIV and/or Hepatitis B/C] transmission) or even hysterectomy.  Patient voiced understanding of all R/B/A/E and has agreed to proceed with  section for delivery if needed or recommended.      Martha Sanon, DO  [facility]  [phone]

## 2023-09-27 LAB
HGB BLD-MCNC: 12 G/DL (ref 11.7–15.7)
T PALLIDUM AB SER QL: NONREACTIVE

## 2023-09-27 PROCEDURE — 250N000011 HC RX IP 250 OP 636: Performed by: ANESTHESIOLOGY

## 2023-09-27 PROCEDURE — 36415 COLL VENOUS BLD VENIPUNCTURE: CPT | Performed by: OBSTETRICS & GYNECOLOGY

## 2023-09-27 PROCEDURE — 250N000013 HC RX MED GY IP 250 OP 250 PS 637: Performed by: ANESTHESIOLOGY

## 2023-09-27 PROCEDURE — 85018 HEMOGLOBIN: CPT | Performed by: OBSTETRICS & GYNECOLOGY

## 2023-09-27 PROCEDURE — 120N000001 HC R&B MED SURG/OB

## 2023-09-27 PROCEDURE — 250N000011 HC RX IP 250 OP 636: Mod: JZ | Performed by: OBSTETRICS & GYNECOLOGY

## 2023-09-27 PROCEDURE — 258N000003 HC RX IP 258 OP 636: Performed by: ANESTHESIOLOGY

## 2023-09-27 PROCEDURE — 258N000003 HC RX IP 258 OP 636: Performed by: OBSTETRICS & GYNECOLOGY

## 2023-09-27 PROCEDURE — 250N000013 HC RX MED GY IP 250 OP 250 PS 637: Performed by: OBSTETRICS & GYNECOLOGY

## 2023-09-27 RX ORDER — NALBUPHINE HYDROCHLORIDE 20 MG/ML
2.5-5 INJECTION, SOLUTION INTRAMUSCULAR; INTRAVENOUS; SUBCUTANEOUS EVERY 6 HOURS PRN
Status: DISCONTINUED | OUTPATIENT
Start: 2023-09-27 | End: 2023-09-29 | Stop reason: HOSPADM

## 2023-09-27 RX ADMIN — SENNOSIDES AND DOCUSATE SODIUM 2 TABLET: 50; 8.6 TABLET ORAL at 08:49

## 2023-09-27 RX ADMIN — KETOROLAC TROMETHAMINE 30 MG: 30 INJECTION, SOLUTION INTRAMUSCULAR; INTRAVENOUS at 11:01

## 2023-09-27 RX ADMIN — Medication: at 00:02

## 2023-09-27 RX ADMIN — ACETAMINOPHEN 975 MG: 325 TABLET ORAL at 12:55

## 2023-09-27 RX ADMIN — NALBUPHINE HYDROCHLORIDE 5 MG: 20 INJECTION, SOLUTION INTRAMUSCULAR; INTRAVENOUS; SUBCUTANEOUS at 16:30

## 2023-09-27 RX ADMIN — ACETAMINOPHEN 975 MG: 325 TABLET ORAL at 06:54

## 2023-09-27 RX ADMIN — NALBUPHINE HYDROCHLORIDE 5 MG: 20 INJECTION, SOLUTION INTRAMUSCULAR; INTRAVENOUS; SUBCUTANEOUS at 08:49

## 2023-09-27 RX ADMIN — KETOROLAC TROMETHAMINE 30 MG: 30 INJECTION, SOLUTION INTRAMUSCULAR; INTRAVENOUS at 04:52

## 2023-09-27 RX ADMIN — ACETAMINOPHEN 975 MG: 325 TABLET ORAL at 18:58

## 2023-09-27 RX ADMIN — SENNOSIDES AND DOCUSATE SODIUM 1 TABLET: 50; 8.6 TABLET ORAL at 21:04

## 2023-09-27 RX ADMIN — SODIUM CHLORIDE, POTASSIUM CHLORIDE, SODIUM LACTATE AND CALCIUM CHLORIDE 75 ML/HR: 600; 310; 30; 20 INJECTION, SOLUTION INTRAVENOUS at 11:06

## 2023-09-27 RX ADMIN — ACETAMINOPHEN 975 MG: 325 TABLET ORAL at 01:15

## 2023-09-27 RX ADMIN — NIFEDIPINE 60 MG: 30 TABLET, EXTENDED RELEASE ORAL at 21:04

## 2023-09-27 RX ADMIN — MAGNESIUM SULFATE HEPTAHYDRATE 2 G/HR: 40 INJECTION, SOLUTION INTRAVENOUS at 14:42

## 2023-09-27 RX ADMIN — ENOXAPARIN SODIUM 40 MG: 40 INJECTION SUBCUTANEOUS at 08:49

## 2023-09-27 RX ADMIN — SODIUM CHLORIDE, POTASSIUM CHLORIDE, SODIUM LACTATE AND CALCIUM CHLORIDE: 600; 310; 30; 20 INJECTION, SOLUTION INTRAVENOUS at 04:52

## 2023-09-27 RX ADMIN — NALBUPHINE HYDROCHLORIDE 5 MG: 20 INJECTION, SOLUTION INTRAMUSCULAR; INTRAVENOUS; SUBCUTANEOUS at 01:03

## 2023-09-27 RX ADMIN — DIPHENHYDRAMINE HYDROCHLORIDE 25 MG: 25 CAPSULE ORAL at 00:03

## 2023-09-27 RX ADMIN — KETOROLAC TROMETHAMINE 30 MG: 30 INJECTION, SOLUTION INTRAMUSCULAR; INTRAVENOUS at 16:58

## 2023-09-27 ASSESSMENT — ACTIVITIES OF DAILY LIVING (ADL)
ADLS_ACUITY_SCORE: 24
ADLS_ACUITY_SCORE: 20
ADLS_ACUITY_SCORE: 24
ADLS_ACUITY_SCORE: 20
ADLS_ACUITY_SCORE: 24
ADLS_ACUITY_SCORE: 20

## 2023-09-27 NOTE — ANESTHESIA PREPROCEDURE EVALUATION
Anesthesia Pre-Procedure Evaluation    Patient: Fadia Cota   MRN: [id number] : 1990        Procedure : Procedure(s):   SECTION          History reviewed. No pertinent past medical history.   Past Surgical History:   Procedure Laterality Date     SECTION N/A 2021    Procedure:  SECTION;  Surgeon: Lala Valiente MD;  Location: [facility] OR      No Known Allergies   Social History     Tobacco Use    Smoking status: Never    Smokeless tobacco: Never   Substance Use Topics    Alcohol use: Not Currently      Wt Readings from Last 1 Encounters:   23 77.6 kg (171 lb)        Anesthesia Evaluation            ROS/MED HX  ENT/Pulmonary:       Neurologic:       Cardiovascular:     (+)  hypertension- - PIH  -  - -                                      METS/Exercise Tolerance:     Hematologic:       Musculoskeletal:       GI/Hepatic:       Renal/Genitourinary:       Endo:       Psychiatric/Substance Use:       Infectious Disease:       Malignancy:       Other:               OUTSIDE LABS:  CBC:   Lab Results   Component Value Date    WBC 10.9 2023    WBC 24.4 (H) 2021    HGB 12.7 2023    HGB 14.2 2022    HCT 38.4 2023    HCT 24.4 (L) 2021     2023     2021     BMP:   Lab Results   Component Value Date     2023     2022    POTASSIUM 4.3 2023    POTASSIUM 4.5 2022    CHLORIDE 103 2023    CHLORIDE 104 2022    CO2 21 (L) 2023    CO2 31 2022    BUN 8.7 2023    BUN 15 2022    CR 0.66 2023    CR 0.88 2022     (H) 2023    GLC 91 2022     COAGS:   Lab Results   Component Value Date    PTT 26 2023    INR 0.94 2023    FIBR 488 2021     POC: No results found for: BGM, HCG, HCGS  HEPATIC:   Lab Results   Component Value Date    ALBUMIN 3.4 (L) 2023    PROTTOTAL 6.8 2023    ALT 13 2023     AST 22 09/26/2023    ALKPHOS 153 (H) 09/26/2023    BILITOTAL 0.2 09/26/2023     OTHER:   Lab Results   Component Value Date    MARCELLA 9.3 09/26/2023    TSH 0.76 03/01/2022       Anesthesia Plan    ASA Status:  2       Anesthesia Type: Spinal.              Consents    Anesthesia Plan(s) and associated risks, benefits, and realistic alternatives discussed. Questions answered and patient/representative(s) expressed understanding.     - Discussed:     - Discussed with:  Patient            Postoperative Care            Comments:                Imani Domínguez MD

## 2023-09-27 NOTE — ANESTHESIA PROCEDURE NOTES
"TAP Procedure Note    Pre-Procedure   Staff -        Anesthesiologist:  Imani Domínguez MD       Performed By: anesthesiologist       Location: OR       Procedure Start/Stop Times: 9/26/2023 9:25 PM and 9/26/2023 9:27 PM       Pre-Anesthestic Checklist: patient identified, IV checked, site marked, risks and benefits discussed, informed consent, monitors and equipment checked, pre-op evaluation, at physician/surgeon's request and post-op pain management  Timeout:       Correct Patient: Yes        Correct Procedure: Yes        Correct Site: Yes        Correct Position: Yes        Correct Laterality: Yes        Site Marked: Yes  Procedure Documentation  Procedure: TAP       Laterality: left       Patient Position: supine       Skin prep: Chloraprep       Ultrasound guided       1. Ultrasound was used to identify targeted nerve, plexus, vascular marker, or fascial plane and place a needle adjacent to it in real-time.       2. Ultrasound was used to visualize the spread of anesthetic in close proximity to the above referenced structure.       3. A permanent image is entered into the patient's record.    Assessment/Narrative         The placement was negative for: blood aspirated, painful injection and site bleeding       Paresthesias: No.       Bolus given via needle..        Secured via.        Insertion/Infusion Method: Single Shot       Complications: none    Medication(s) Administered   Bupivacaine 0.25% PF (Infiltration) - Infiltration   20 mL - 9/26/2023 9:25:00 PM  Bupivacaine liposome (Exparel) 1.3% LA inj susp (Infiltration) - Infiltration   10 mL - 9/26/2023 9:25:00 PM  Medication Administration Time: 9/26/2023 9:25 PM      FOR [facility] ([facility]) ONLY:   Pain Team Contact information: please page the Pain Team Via [employer]. Search \"Pain\". During daytime hours, please page the attending first. At night please page the resident first.      "

## 2023-09-27 NOTE — ANESTHESIA PROCEDURE NOTES
"Intrathecal injection Procedure Note    Pre-Procedure   Staff -        Anesthesiologist:  Imani Domínguez MD       Performed By: anesthesiologist       Location: OR       Procedure Start/Stop Times: 9/26/2023 8:17 PM and 9/26/2023 8:20 PM       Pre-Anesthestic Checklist: patient identified, IV checked, risks and benefits discussed, informed consent, monitors and equipment checked, pre-op evaluation, at physician/surgeon's request and post-op pain management  Timeout:       Correct Patient: Yes        Correct Procedure: Yes        Correct Site: Yes        Correct Position: Yes   Procedure Documentation  Procedure: intrathecal injection       Patient Position: sitting       Skin prep: Betadine       Insertion Site: L4-5. (right paramedian approach).       Needle Gauge: 20.        Needle Length (Inches): 4        Spinal Needle Type: Sprotte       Introducer used       # of attempts: 1 and  # of redirects:     Assessment/Narrative         Paresthesias: No.       CSF fluid: clear.    Medication(s) Administered   0.75% Hyperbaric Bupivacaine (Intrathecal) - Intrathecal   1.6 mL - 9/26/2023 8:17:00 PM  Morphine PF 1 mg/mL (Intrathecal) - Intrathecal   0.15 mg - 9/26/2023 8:17:00 PM  Medication Administration Time: 9/26/2023 8:17 PM      FOR [facility] ([facility]) ONLY:   Pain Team Contact information: please page the Pain Team Via [employer]. Search \"Pain\". During daytime hours, please page the attending first. At night please page the resident first.      "

## 2023-09-27 NOTE — ANESTHESIA CARE TRANSFER NOTE
Patient: Fadia Cota    Procedure: Procedure(s):   SECTION       Diagnosis: Pre-eclampsia in third trimester [O14.93]  Diagnosis Additional Information: No value filed.    Anesthesia Type:   No value filed.     Note:    Oropharynx: oropharynx clear of all foreign objects and spontaneously breathing  Level of Consciousness: awake  Oxygen Supplementation: room air    Independent Airway: airway patency satisfactory and stable  Dentition: dentition unchanged  Vital Signs Stable: post-procedure vital signs reviewed and stable  Report to RN Given: handoff report given  Patient transferred to: Labor and Delivery    Handoff Report: Identifed the Patient, Identified the Reponsible Provider, Reviewed the pertinent medical history, Discussed the surgical course, Reviewed Intra-OP anesthesia mangement and issues during anesthesia, Set expectations for post-procedure period and Allowed opportunity for questions and acknowledgement of understanding      Vitals:  Vitals Value Taken Time   /74 23 2142   Temp 36.5  C (97.7  F) 23 214   Pulse     Resp 15 23 2141   SpO2 97 % 23   Vitals shown include unvalidated device data.    Electronically Signed By: LIVAN Boo CRNA  2023  9:45 PM

## 2023-09-27 NOTE — ANESTHESIA PROCEDURE NOTES
"TAP Procedure Note    Pre-Procedure   Staff -        Anesthesiologist:  Imani Domínguez MD       Performed By: anesthesiologist       Location: OR       Procedure Start/Stop Times: 9/26/2023 9:28 PM and 9/26/2023 9:30 PM       Pre-Anesthestic Checklist: patient identified, IV checked, site marked, risks and benefits discussed, informed consent, monitors and equipment checked, pre-op evaluation, at physician/surgeon's request and post-op pain management  Timeout:       Correct Patient: Yes        Correct Procedure: Yes        Correct Site: Yes        Correct Position: Yes        Correct Laterality: Yes        Site Marked: Yes  Procedure Documentation  Procedure: TAP       Laterality: right       Patient Position: supine       Skin prep: Chloraprep       Ultrasound guided       1. Ultrasound was used to identify targeted nerve, plexus, vascular marker, or fascial plane and place a needle adjacent to it in real-time.       2. Ultrasound was used to visualize the spread of anesthetic in close proximity to the above referenced structure.       3. A permanent image is entered into the patient's record.    Assessment/Narrative         The placement was negative for: blood aspirated, painful injection and site bleeding       Paresthesias: No.       Bolus given via needle..        Secured via.        Insertion/Infusion Method: Single Shot       Complications: none    Medication(s) Administered   Bupivacaine 0.25% PF (Infiltration) - Infiltration   20 mL - 9/26/2023 9:28:00 PM  Bupivacaine liposome (Exparel) 1.3% LA inj susp (Infiltration) - Infiltration   10 mL - 9/26/2023 9:28:00 PM  Medication Administration Time: 9/26/2023 9:28 PM      FOR [facility] ([facility]) ONLY:   Pain Team Contact information: please page the Pain Team Via [employer]. Search \"Pain\". During daytime hours, please page the attending first. At night please page the resident first.      "

## 2023-09-27 NOTE — PROGRESS NOTES
" Postpartum Day 1    Patient Name:  Fadia Cota  :  1990  MRN:  [id number]      Assessment:  POD 1 s/p repeat CS for cHTN with superimposed preeclampsia. Stable recovery, doing well. Normotensive since delivery, on her same Nifedipine XL 60mg daily (takes at night).    Plan:  Discontinue Mag at 2030 tonight. Continue to monitor Bps; continue Nifedipine XL 60mg daily. Discontinue sullivan when ambulatory. Ambulate as able, regular diet, oral pain meds.    Subjective:  The patient feels well; no side effects on the Mag.  Lochia normal, tolerating small amounts normal diet. No nausea. Did get up and ambulate a little when 6hrs postop, tolerated well.  Pain is well controlled with current medications (just Toradol and Tylenol so far).  The patient has no emotional concerns.  The baby is well and being fed by breast.    YOB: 2024   Birth Time: 8:38 PM     Prenatal complications:cHTN on Nifedipine XL 60mg at bedtime; history of prior CS  for arrest of dilation at 6cm, prolonged ROM, cHTN at 39+2wks    Objective:  /57   Temp 98.4  F (36.9  C) (Oral)   Resp 16   Ht 1.727 m (5' 8\")   Wt 77.6 kg (171 lb)   LMP 2023   SpO2 95%   Breastfeeding Unknown   BMI 26.00 kg/m    Patient Vitals for the past 24 hrs:   BP Temp Temp src Resp SpO2 Height Weight   23 0802 -- -- -- -- 95 % -- --   23 0757 -- -- -- -- 94 % -- --   23 0752 -- -- -- -- 93 % -- --   23 0750 -- -- -- -- (!) 89 % -- --   23 0747 -- -- -- -- 93 % -- --   23 0745 102/57 -- -- -- -- -- --   23 0742 -- -- -- -- 94 % -- --   23 0741 -- -- -- -- 91 % -- --   23 -- -- -- -- 94 % -- --   23 -- -- -- -- 93 % -- --   23 -- -- -- -- 93 % -- --   23 -- -- -- -- 93 % -- --   23 -- -- -- -- 93 % -- --   23 -- -- -- -- 93 % -- --   23 -- -- -- -- 95 % -- --   23 -- -- -- -- 93 % -- -- " "  09/27/23 0657 -- -- -- -- 94 % -- --   09/27/23 0652 -- -- -- -- 93 % -- --   09/27/23 0647 -- -- -- -- 93 % -- --   09/27/23 0645 104/55 -- -- -- 90 % -- --   09/27/23 0547 -- -- -- -- 93 % -- --   09/27/23 0545 99/57 -- -- 16 -- -- --   09/27/23 0542 -- -- -- -- 93 % -- --   09/27/23 0447 -- -- -- -- 98 % -- --   09/27/23 0445 123/78 -- -- 16 -- -- --   09/27/23 0442 -- -- -- -- 98 % -- --   09/27/23 0437 -- -- -- -- 94 % -- --   09/27/23 0345 119/75 -- -- 16 -- -- --   09/27/23 0248 -- -- -- -- 96 % -- --   09/27/23 0245 131/87 98.4  F (36.9  C) Oral 16 -- -- --   09/27/23 0243 -- -- -- -- 96 % -- --   09/27/23 0238 -- -- -- -- 96 % -- --   09/27/23 0145 118/70 -- -- 16 -- -- --   09/27/23 0041 -- -- -- -- 97 % -- --   09/27/23 0036 -- -- -- -- 96 % -- --   09/27/23 0034 121/73 -- -- 16 -- -- --   09/27/23 0026 -- -- -- -- 97 % -- --   09/27/23 0021 -- -- -- -- 96 % -- --   09/27/23 0016 -- -- -- -- 98 % -- --   09/27/23 0011 -- -- -- -- 98 % -- --   09/27/23 0006 -- -- -- -- 97 % -- --   09/27/23 0003 131/80 -- -- 16 -- -- --   09/27/23 0001 -- -- -- -- 98 % -- --   09/26/23 2356 -- -- -- -- 97 % -- --   09/26/23 2351 -- -- -- -- 97 % -- --   09/26/23 2346 -- -- -- -- 97 % -- --   09/26/23 2343 (!) 141/81 -- -- 16 -- -- --   09/26/23 2331 -- -- -- -- 97 % -- --   09/26/23 2328 137/79 -- -- 16 -- -- --   09/26/23 2326 -- -- -- -- 97 % -- --   09/26/23 2321 -- -- -- -- 97 % -- --   09/26/23 2316 -- -- -- -- 95 % -- --   09/26/23 2313 131/77 -- -- 16 -- -- --   09/26/23 2311 -- -- -- -- 97 % -- --   09/26/23 2300 129/66 -- -- 16 96 % 1.727 m (5' 8\") 77.6 kg (171 lb)   09/26/23 2226 129/86 -- -- -- 98 % -- --   09/26/23 2213 137/65 -- -- -- 97 % -- --   09/26/23 2205 -- -- -- 18 -- -- --   09/26/23 2158 134/70 -- -- -- -- -- --   09/26/23 2142 133/74 -- -- -- -- -- --   09/26/23 2141 133/77 97.7  F (36.5  C) Oral 15 97 % -- --   09/26/23 1906 133/83 -- -- 16 96 % -- --   09/26/23 1847 130/86 -- -- 16 -- -- -- "   09/26/23 1833 137/87 -- -- 18 -- -- --   09/26/23 1816 (!) 140/95 -- -- 16 -- -- --   09/26/23 1800 (!) 145/92 -- -- -- -- -- --   09/26/23 1737 (!) 156/98 -- -- -- -- -- --   09/26/23 1717 (!) 168/96 98.4  F (36.9  C) Oral 16 -- -- --   09/26/23 1700 (!) 166/94 -- -- -- -- -- --     I/O:  4314/1468.  Had 5000cc urine output overnight  Exam: Patient A&O x 3. No acute distress, breathing unlabored. The amount and color of the lochia is appropriate for the duration of recovery. Uterine fundus is firm at U-1. Urinary output is adequate. Has abd binder on.    Lab Results   Component Value Date    AS Negative 09/26/2023    HGB 12.0 09/27/2023       Immunization History   Administered Date(s) Administered    COVID-19 MONOVALENT 12+ (Pfizer) 08/16/2021, 09/07/2021, 02/12/2022    TDAP Vaccine (Adacel) 09/24/2021       Provider: Lala Valiente MD    Date:  9/27/2023  Time:  8:17 AM

## 2023-09-27 NOTE — PLAN OF CARE
Problem: Breastfeeding  Goal: Effective Breastfeeding  Outcome: Progressing     Problem: Postpartum ( Delivery)  Goal: Effective Urinary Elimination  Outcome: Progressing     Problem: Postpartum ( Delivery)  Goal: Optimal Pain Control and Function  Outcome: Progressing  Intervention: Prevent or Manage Pain  Recent Flowsheet Documentation  Taken 2023 0245 by Vicky Herrera, RN  Pain Management Interventions: medication (see MAR)  Perineal Care: absorbent brief/pad changed  Taken 2023 0034 by Vicky Herrera RN  Perineal Care: absorbent brief/pad changed  Taken 2023 2313 by Vicky Herrera, RN  Perineal Care: absorbent brief/pad changed   Goal Outcome Evaluation:    Plan of Care Reviewed With: patient, spouse               Fadia will rate her pain less than 3 with the use of Ibuprofen/toradol and Tylenol. She denies pain at this time and was able to walk to bathroom without difficulty. Encouraged her to take pain medication as scheduled and to let nurse know if it starts to get worse. She  her first child for a year and is independent in latching and positioning. She knows to call out for assistance if needed. Will continue to monitor.

## 2023-09-27 NOTE — PLAN OF CARE
"  Problem: Plan of Care - These are the overarching goals to be used throughout the patient stay.    Goal: Plan of Care Review  Description: The Plan of Care Review/Shift note should be completed every shift.  The Outcome Evaluation is a brief statement about your assessment that the patient is improving, declining, or no change.  This information will be displayed automatically on your shift note.  Outcome: Progressing  Goal: Patient-Specific Goal (Individualized)  Description: You can add care plan individualizations to a care plan. Examples of Individualization might be:  \"Parent requests to be called daily at 9am for status\", \"I have a hard time hearing out of my right ear\", or \"Do not touch me to wake me up as it startles me\".  Outcome: Progressing  Goal: Absence of Hospital-Acquired Illness or Injury  Outcome: Progressing  Goal: Optimal Comfort and Wellbeing  Outcome: Progressing  Intervention: Provide Person-Centered Care  Recent Flowsheet Documentation  Taken 2023 1700 by Anjali Hernandez RN  Trust Relationship/Rapport:   care explained   choices provided   questions answered   reassurance provided  Taken 2023 0845 by Anjali Hernandez, RN  Trust Relationship/Rapport:   care explained   choices provided   questions answered   reassurance provided  Goal: Readiness for Transition of Care  Outcome: Progressing     Problem: Postpartum ( Delivery)  Goal: Successful Maternal Role Transition  Outcome: Progressing  Goal: Hemostasis  Outcome: Progressing  Goal: Effective Bowel Elimination  Outcome: Progressing  Goal: Fluid and Electrolyte Balance  Outcome: Progressing  Goal: Absence of Infection Signs and Symptoms  Outcome: Progressing  Goal: Anesthesia/Sedation Recovery  Outcome: Progressing  Goal: Optimal Pain Control and Function  Outcome: Progressing  Goal: Nausea and Vomiting Relief  Outcome: Progressing  Goal: Effective Urinary Elimination  Outcome: Progressing  Goal: Effective Oxygenation and " Ventilation  Outcome: Progressing     Problem: Breastfeeding  Goal: Effective Breastfeeding  Outcome: Progressing   Goal Outcome Evaluation:                  Patient's assessments and VS have been WDL. Plan for magnesium sulfate drip to be turned off at 24 hours postpartum at 2030. She is breastfeeding on cue, she denies any pain or pinching with nursing and hasn't needed any assistance with positioning or latching. Her pain is well controled with acetaminophen and Toradol.

## 2023-09-27 NOTE — PLAN OF CARE
"  Problem: Plan of Care - These are the overarching goals to be used throughout the patient stay.    Goal: Plan of Care Review  Description: The Plan of Care Review/Shift note should be completed every shift.  The Outcome Evaluation is a brief statement about your assessment that the patient is improving, declining, or no change.  This information will be displayed automatically on your shift note.  Outcome: Progressing  Goal: Patient-Specific Goal (Individualized)  Description: You can add care plan individualizations to a care plan. Examples of Individualization might be:  \"Parent requests to be called daily at 9am for status\", \"I have a hard time hearing out of my right ear\", or \"Do not touch me to wake me up as it startles me\".  Outcome: Progressing  Goal: Absence of Hospital-Acquired Illness or Injury  Outcome: Progressing  Goal: Optimal Comfort and Wellbeing  Outcome: Progressing  Goal: Readiness for Transition of Care  Outcome: Progressing  Intervention: Mutually Develop Transition Plan  Recent Flowsheet Documentation  Taken 2023 1800 by Araceli Gross RN  Equipment Currently Used at Home: none     Problem: Postpartum ( Delivery)  Goal: Successful Maternal Role Transition  Outcome: Progressing  Goal: Hemostasis  Outcome: Progressing  Goal: Effective Bowel Elimination  Outcome: Progressing  Goal: Fluid and Electrolyte Balance  Outcome: Progressing  Goal: Absence of Infection Signs and Symptoms  Outcome: Progressing  Goal: Anesthesia/Sedation Recovery  Outcome: Progressing  Goal: Optimal Pain Control and Function  Outcome: Progressing  Goal: Nausea and Vomiting Relief  Outcome: Progressing  Goal: Effective Urinary Elimination  Outcome: Progressing  Goal: Effective Oxygenation and Ventilation  Outcome: Progressing   Goal Outcome Evaluation:                        "

## 2023-09-27 NOTE — ANESTHESIA POSTPROCEDURE EVALUATION
Patient: Fadia Cota    Procedure: Procedure(s):   SECTION       Anesthesia Type:  Spinal    Note:  Disposition: Inpatient   Postop Pain Control: Uneventful            Sign Out: Well controlled pain   PONV: No   Neuro/Psych: Uneventful            Sign Out: Acceptable/Baseline neuro status   Airway/Respiratory: Uneventful            Sign Out: Acceptable/Baseline resp. status   CV/Hemodynamics: Uneventful            Sign Out: Acceptable CV status; No obvious hypovolemia; No obvious fluid overload   Other NRE: NONE   DID A NON-ROUTINE EVENT OCCUR? No    Event details/Postop Comments:  Recovering uneventfully. Denies any headache, nausea, vomiting, numbness, tingling, or weakness.           Last vitals:  Vitals:    23 1107 23 1114 23 1119   BP:      Resp:      Temp:      SpO2: 96% 97% 97%       Electronically Signed By: Shawn Thomas MD  2023  11:48 AM

## 2023-09-27 NOTE — OP NOTE
Section Operative Note      Pre-operative Diagnosis: 1.  Intrauterine pregnancy 38 week 2 days gestation  2.  cHTN with superimposed pre eclampsia   3.  Hx of previous     Post-operative Diagnosis: same, delivered    Procedure:  Repeat low transverse  section     Surgeon:  Martha Sanon DO    Assist: surgical tech    Anesthesia:  spinal     Estimated Blood Loss:   573cc    Complications:  None; patient tolerated the procedure well.    Specimens: placenta for hospital disposal    Indications for surgery:  Patient is  with IUP at 38w2d, she was undergoing  testing due to cHTN that was previously well controlled. BPP in the office was 6/8, off for breathing, so she was sent to the hospital for NST. NST was reactive, however she was noted to have multiple severe range blood pressures. Discussed delivery today and starting magnesium for seizure prophylaxis. Patient was agreeable. The risks and benefits were discussed and consent obtained to proceed.    Findings:  Viable M infant, weight unavailable at time of report, APGARS 9,9, normal appearing uterus, fallopian tubes and ovaries bilaterally, minimal adhesive disease.     Procedure Details   The patient was taken to the Operating Room with IV fluids running, identified as Fadia Cota and the procedure verified as  Delivery. A Time Out was held and the above information confirmed.    After administration of spinal anesthesia, the patient was positioned in the left lateral tilt position and was prepped and draped in the usual sterile fashion. A Pfannenstiel incision was made and carried down sharply through the subcutaneous tissue with the scalpel.  The fascia was incised in the midline and carried laterally with the curry scissors, and the rectus abdominis muscles bluntly and sharply dissected away from the fascia superiorly and inferiorly to the pubic symphysis.  The rectus muscles were  in the midline, and  the peritoneum was identified.  The peritoneum was then entered.  The vesicouterine peritoneal reflection was incised transversely and the bladder flap was bluntly dissected away from the lower uterine segment.     A horizontal incision was made in the lower uterine segment, and the incision was extended bilaterally in a curvilinear fashion with gentle blunt traction.  Membranes were ruptured and the amnoitic fluid was  clear.  The infant was delivered from a vertex presentation at 2038 hrs.  There was a loose nuchal cord.  Mouth and nares were bulb suctioned and cord doubly clamped and cut.  The infant was passed off to the Pediatric team in attendance with findings as noted above.      The placenta was delivered spontaneously, intact, with a 3 vessel cord.  The uterine cavity was wiped free of remaining clot and membrane.  The uterus was exteriorized. There were no extensions of the uterine incision.  The uterus was closed with a layer of continuous running locked 0 vicryl, followed by a second imbricating layer of 0 monocryl.  Multiple figure of eight sutures of 0 vicryl was placed along the hysterotomy. The uterine incision was inspected and all was hemostatic.  The Fallopian tubes and ovaries were examined and appeared normal.  The bladder flap was inspected and was hemostatic. The abdomen was irrigated. The uterus was replaced into the abdominal cavity.  The pericolic gutters were swabbed clean.  The uterine incision was once again inspected once back in its normal anatomic position and was hemostatic. Surgicel powder was placed along the hysterotomy for additional hemostasis.  The subfascial space was inspected and hemostasis achieved with electrocautery.  The fascia was closed withcontinuous running 0 vicryl. The subcutaneous tissue was irrigated and hemostasis achieved with electrocautery.  The subcutaneous tissue was reapproximated with 3-0 vicryl suture. Skin edges reapproximated with subcuticular 4.0  monocryl.  The incision was dressed with dermabond.    Estimated blood loss was 573cc.  Sponge and needle counts were correct.  There were no complications.  The patient tolerated the procedure well and was transferred to her recovery room in good condition.  The infant will be under [facility] status.      Martha Sanon DO

## 2023-09-28 ENCOUNTER — ANESTHESIA (OUTPATIENT)
Dept: OBGYN | Facility: HOSPITAL | Age: 33
End: 2023-09-28
Payer: COMMERCIAL

## 2023-09-28 PROCEDURE — 250N000011 HC RX IP 250 OP 636: Mod: JZ | Performed by: OBSTETRICS & GYNECOLOGY

## 2023-09-28 PROCEDURE — 120N000001 HC R&B MED SURG/OB

## 2023-09-28 PROCEDURE — 250N000011 HC RX IP 250 OP 636: Performed by: ANESTHESIOLOGY

## 2023-09-28 PROCEDURE — 250N000013 HC RX MED GY IP 250 OP 250 PS 637: Performed by: OBSTETRICS & GYNECOLOGY

## 2023-09-28 RX ORDER — ENOXAPARIN SODIUM 100 MG/ML
40 INJECTION SUBCUTANEOUS EVERY 24 HOURS
Status: DISCONTINUED | OUTPATIENT
Start: 2023-09-29 | End: 2023-09-29 | Stop reason: HOSPADM

## 2023-09-28 RX ADMIN — IBUPROFEN 800 MG: 800 TABLET ORAL at 12:44

## 2023-09-28 RX ADMIN — ACETAMINOPHEN 975 MG: 325 TABLET ORAL at 18:50

## 2023-09-28 RX ADMIN — IBUPROFEN 800 MG: 800 TABLET ORAL at 18:50

## 2023-09-28 RX ADMIN — NALBUPHINE HYDROCHLORIDE 2.5 MG: 20 INJECTION, SOLUTION INTRAMUSCULAR; INTRAVENOUS; SUBCUTANEOUS at 00:22

## 2023-09-28 RX ADMIN — SENNOSIDES AND DOCUSATE SODIUM 1 TABLET: 50; 8.6 TABLET ORAL at 22:19

## 2023-09-28 RX ADMIN — ACETAMINOPHEN 975 MG: 325 TABLET ORAL at 00:55

## 2023-09-28 RX ADMIN — IBUPROFEN 800 MG: 800 TABLET ORAL at 06:39

## 2023-09-28 RX ADMIN — ACETAMINOPHEN 975 MG: 325 TABLET ORAL at 06:39

## 2023-09-28 RX ADMIN — ENOXAPARIN SODIUM 40 MG: 40 INJECTION SUBCUTANEOUS at 09:52

## 2023-09-28 RX ADMIN — OXYCODONE HYDROCHLORIDE 5 MG: 5 TABLET ORAL at 22:19

## 2023-09-28 RX ADMIN — ACETAMINOPHEN 975 MG: 325 TABLET ORAL at 12:44

## 2023-09-28 RX ADMIN — NIFEDIPINE 60 MG: 30 TABLET, EXTENDED RELEASE ORAL at 22:19

## 2023-09-28 RX ADMIN — SENNOSIDES AND DOCUSATE SODIUM 2 TABLET: 50; 8.6 TABLET ORAL at 09:51

## 2023-09-28 RX ADMIN — IBUPROFEN 800 MG: 800 TABLET ORAL at 00:12

## 2023-09-28 ASSESSMENT — ACTIVITIES OF DAILY LIVING (ADL)
ADLS_ACUITY_SCORE: 20

## 2023-09-28 NOTE — PLAN OF CARE
Goal Outcome Evaluation:  Problem: Postpartum ( Delivery)  Goal: Optimal Pain Control and Function  Outcome: Progressing  Pt is taking pain medication when scheduled in order to stay on top of pain management.

## 2023-09-28 NOTE — PLAN OF CARE
Problem: Breastfeeding  Goal: Effective Breastfeeding  Outcome: Progressing     Problem: Postpartum ( Delivery)  Goal: Optimal Pain Control and Function  Outcome: Progressing     Pt's vital signs WNL. Postpartum assessment WNL. Bleeding is scant, firm 1 below U. Pt bonding with baby boy. Independent with cares for baby, voiding without difficulty, ambulating well. Breastfeeding baby every 2-3 hours. Independent with positioning and latching.     Pain managed with ibuprofen, tylenol and ice packs. Unable to visualize incision due to dressing. Dressing is clean, dry and intact.     Lorena Newton RN on 2023 at 4:45 AM

## 2023-09-28 NOTE — PROGRESS NOTES
" Postpartum Day 2    Patient Name:  Fadia Cota  :  1990  MRN:  [id number]      Assessment:  POD#2 s/p repeat . CHTN w/ superimposed pre-e s/p magnesium sulfate x 24 hours postpartum (off ). Normotensive now.     Plan:  Continue current care. Remove dressing in shower. Continue Nifedipine 60mg XL daily. Discharge home tomorrow.      Subjective:  The patient feels well:  Voiding without difficulty, lochia normal, tolerating normal diet, and passing flatus.  Pain is well controlled with current medications. The patient has no emotional concerns.  The baby is well. Did not have side effects on mag. Feels great. Denies headache/visual changes/RUQ pain. BP's normal as noted below. Discussed need to monitor BP's for at least 24 hours off mag. This would mean a late discharge tonight if she desired to go home and after discussion she desires to stay another night.     YOB: 2024     Birth Time: 8:38 PM     Prenatal Complications:   cHTN on Nifedipine XL 60mg at bedtime; history of prior CS  for arrest of dilation at 6cm, prolonged ROM, cHTN at 39+2wks     Objective:  /57 (BP Location: Right arm, Patient Position: Semi-Ng's, Cuff Size: Adult Regular)   Pulse 78   Temp 98.2  F (36.8  C) (Oral)   Resp 20   Ht 1.727 m (5' 8\")   Wt 78 kg (171 lb 14.4 oz)   LMP 2023   SpO2 95%   Breastfeeding Unknown   BMI 26.14 kg/m    Patient Vitals for the past 24 hrs:   BP Temp Temp src Pulse Resp SpO2 Weight   23 0821 106/57 98.2  F (36.8  C) Oral 78 20 95 % --   23 0639 -- -- -- -- -- -- 78 kg (171 lb 14.4 oz)   23 0410 115/70 98.4  F (36.9  C) Oral 60 16 96 % --   23 0000 131/77 98.6  F (37  C) Oral -- 16 96 % --   23 -- -- -- -- -- 99 % --   23 -- -- -- -- -- 99 % --   23 131/88 -- -- -- 16 -- --   23 -- -- -- -- -- 98 % --   23 -- -- -- -- -- 97 % --   23 -- -- -- -- -- " (!) 85 % --   09/27/23 1818 127/80 -- -- -- -- -- --   09/27/23 1813 -- -- -- -- -- (!) 86 % --   09/27/23 1809 -- -- -- -- -- (!) 88 % --   09/27/23 1808 -- -- -- -- -- 97 % --   09/27/23 1803 -- -- -- -- -- 96 % --   09/27/23 1758 -- -- -- -- -- 98 % --   09/27/23 1753 -- -- -- -- -- 96 % --   09/27/23 1752 -- -- -- -- -- (!) 89 % --   09/27/23 1748 -- -- -- -- -- 95 % --   09/27/23 1743 -- -- -- -- -- 94 % --   09/27/23 1738 -- -- -- -- -- 95 % --   09/27/23 1737 -- -- -- -- -- 91 % --   09/27/23 1733 -- -- -- -- -- 100 % --   09/27/23 1728 -- -- -- -- -- 100 % --   09/27/23 1725 -- -- -- -- -- (!) 86 % --   09/27/23 1723 -- -- -- -- -- 100 % --   09/27/23 1718 -- -- -- -- -- 100 % --   09/27/23 1713 -- -- -- -- -- 100 % --   09/27/23 1708 -- -- -- -- -- 100 % --   09/27/23 1704 -- -- -- -- -- (!) 88 % --   09/27/23 1703 -- -- -- -- -- 95 % --   09/27/23 1659 120/80 97.4  F (36.3  C) Oral -- 16 -- --   09/27/23 1658 -- -- -- -- -- 100 % --   09/27/23 1653 -- -- -- -- -- 99 % --   09/27/23 1648 -- -- -- -- -- 99 % --   09/27/23 1643 -- -- -- -- -- 99 % --   09/27/23 1638 -- -- -- -- -- 100 % --   09/27/23 1633 -- -- -- -- -- 97 % --   09/27/23 1629 -- -- -- -- -- 90 % --   09/27/23 1628 -- -- -- -- -- 100 % --   09/27/23 1614 (!) 129/91 -- -- -- -- -- --   09/27/23 1550 -- -- -- -- -- (!) 84 % --   09/27/23 1549 -- -- -- -- -- 99 % --   09/27/23 1544 -- -- -- -- -- 100 % --   09/27/23 1539 -- -- -- -- -- 96 % --   09/27/23 1534 -- -- -- -- -- 100 % --   09/27/23 1529 -- -- -- -- -- 99 % --   09/27/23 1524 -- -- -- -- -- 98 % --   09/27/23 1519 -- -- -- -- -- 99 % --   09/27/23 1514 -- -- -- -- -- 100 % --   09/27/23 1509 -- -- -- -- -- 100 % --   09/27/23 1504 -- -- -- -- -- 100 % --   09/27/23 1459 -- -- -- -- -- 100 % --   09/27/23 1454 -- -- -- -- -- 100 % --   09/27/23 1445 117/68 -- -- -- -- -- --   09/27/23 1440 -- -- -- -- -- 100 % --   09/27/23 1435 -- -- -- -- -- 99 % --   09/27/23 1430 -- -- -- -- -- 100  % --   09/27/23 1425 -- -- -- -- -- 99 % --   09/27/23 1424 -- -- -- -- -- (!) 85 % --   09/27/23 1420 -- -- -- -- -- 100 % --   09/27/23 1415 -- -- -- -- -- 99 % --   09/27/23 1410 -- -- -- -- -- 98 % --   09/27/23 1405 -- -- -- -- -- 98 % --   09/27/23 1400 -- -- -- -- -- 99 % --   09/27/23 1355 -- -- -- -- -- 98 % --   09/27/23 1350 -- -- -- -- -- 98 % --   09/27/23 1345 105/66 -- -- -- -- 100 % --   09/27/23 1340 -- -- -- -- -- 99 % --   09/27/23 1335 -- -- -- -- -- 100 % --   09/27/23 1330 -- -- -- -- -- 100 % --   09/27/23 1325 -- -- -- -- -- 100 % --   09/27/23 1320 -- -- -- -- -- 99 % --   09/27/23 1315 -- -- -- -- -- 96 % --   09/27/23 1254 -- -- -- -- -- 97 % --   09/27/23 1249 -- -- -- -- -- 96 % --   09/27/23 1245 113/64 98  F (36.7  C) Oral -- 16 -- --   09/27/23 1244 -- -- -- -- -- 98 % --   09/27/23 1239 -- -- -- -- -- 96 % --   09/27/23 1234 -- -- -- -- -- 96 % --   09/27/23 1229 -- -- -- -- -- 96 % --   09/27/23 1224 -- -- -- -- -- 96 % --   09/27/23 1219 -- -- -- -- -- 97 % --   09/27/23 1214 -- -- -- -- -- 98 % --   09/27/23 1209 -- -- -- -- -- 97 % --   09/27/23 1204 -- -- -- -- -- 98 % --   09/27/23 1159 -- -- -- -- -- 97 % --       Exam: Patient A&O x 3. No acute distress, breathing unlabored. The amount and color of the lochia is appropriate for the duration of recovery. Uterine fundus is firm at U. Urinary output is adequate. The covered low transverse incision is healing well.  The patient is ambulating well without assistance.  The patient is tolerating a regular diet.    Lab Results   Component Value Date    AS Negative 09/26/2023    HGB 12.0 09/27/2023       Immunization History   Administered Date(s) Administered    COVID-19 MONOVALENT 12+ (Pfizer) 08/16/2021, 09/07/2021, 02/12/2022    TDAP Vaccine (Adacel) 09/24/2021         Provider:  LIVAN Mcgraw CNM    Date:  9/28/2023  Time:  11:58 AM

## 2023-09-28 NOTE — LACTATION NOTE
"Lactation consultant to patient room to assess breastfeeding. Mom states she breast fed her first child for over 1 year without concerns. First day with this baby breastfeeding went well. States that today he has been very fussy and hard to latch. Has given donor milk and pumped 2x.    Reviewed benefit of skin to skin prior to feeding to waken and ready for feeding, importance of feeding baby on early hunger cues so he is not \"Hangry\", and breastfeeding 8-12 times in 24 hours for adequate infant nutrition and hydration as well as for building an optimal milk supply.     Mom brought infant to breast in cradle hold and infant fussy and not latching. Instructed on importance of optimal infant positioning for deep, comfortable latch and effective milk transfer in the early days and assisted with cross cradle hold, and infant able to latch deeply and began rhythmic sucking. Infant recently circumcised, and became sleepy at the breast. . Reviewed techniques for keeping infant actively sucking, including breast compressions.  Baby still sleepy, so I encouraged mom to use pumped colostrum and finger feed to infant to give him a little energy at the breast.    Answered questions and gave encouragement.    "

## 2023-09-29 VITALS
OXYGEN SATURATION: 96 % | RESPIRATION RATE: 16 BRPM | HEIGHT: 68 IN | HEART RATE: 85 BPM | DIASTOLIC BLOOD PRESSURE: 87 MMHG | TEMPERATURE: 98.4 F | WEIGHT: 171.6 LBS | SYSTOLIC BLOOD PRESSURE: 129 MMHG | BODY MASS INDEX: 26.01 KG/M2

## 2023-09-29 PROBLEM — O14.10 PREECLAMPSIA, SEVERE: Status: ACTIVE | Noted: 2023-09-29

## 2023-09-29 PROBLEM — O10.919 CHRONIC HYPERTENSION AFFECTING PREGNANCY: Status: ACTIVE | Noted: 2021-11-27

## 2023-09-29 LAB — PLATELET # BLD AUTO: 296 10E3/UL (ref 150–450)

## 2023-09-29 PROCEDURE — 250N000013 HC RX MED GY IP 250 OP 250 PS 637: Performed by: OBSTETRICS & GYNECOLOGY

## 2023-09-29 PROCEDURE — 250N000011 HC RX IP 250 OP 636: Mod: JZ | Performed by: OBSTETRICS & GYNECOLOGY

## 2023-09-29 PROCEDURE — 85049 AUTOMATED PLATELET COUNT: CPT | Performed by: OBSTETRICS & GYNECOLOGY

## 2023-09-29 PROCEDURE — 36415 COLL VENOUS BLD VENIPUNCTURE: CPT | Performed by: OBSTETRICS & GYNECOLOGY

## 2023-09-29 RX ORDER — ACETAMINOPHEN 325 MG/1
975 TABLET ORAL EVERY 6 HOURS
Qty: 50 TABLET | Refills: 0 | Status: SHIPPED | OUTPATIENT
Start: 2023-09-29

## 2023-09-29 RX ORDER — OXYCODONE HYDROCHLORIDE 5 MG/1
5 TABLET ORAL EVERY 6 HOURS PRN
Qty: 12 TABLET | Refills: 0 | Status: SHIPPED | OUTPATIENT
Start: 2023-09-29

## 2023-09-29 RX ORDER — AMOXICILLIN 250 MG
1 CAPSULE ORAL DAILY PRN
Qty: 60 TABLET | Refills: 0 | Status: SHIPPED | OUTPATIENT
Start: 2023-09-29

## 2023-09-29 RX ORDER — IBUPROFEN 800 MG/1
800 TABLET, FILM COATED ORAL EVERY 8 HOURS PRN
Qty: 30 TABLET | Refills: 0 | Status: SHIPPED | OUTPATIENT
Start: 2023-09-29

## 2023-09-29 RX ADMIN — OXYCODONE HYDROCHLORIDE 5 MG: 5 TABLET ORAL at 02:54

## 2023-09-29 RX ADMIN — OXYCODONE HYDROCHLORIDE 5 MG: 5 TABLET ORAL at 11:04

## 2023-09-29 RX ADMIN — Medication: at 11:09

## 2023-09-29 RX ADMIN — ENOXAPARIN SODIUM 40 MG: 40 INJECTION SUBCUTANEOUS at 08:24

## 2023-09-29 RX ADMIN — IBUPROFEN 800 MG: 800 TABLET ORAL at 08:24

## 2023-09-29 RX ADMIN — SENNOSIDES AND DOCUSATE SODIUM 1 TABLET: 50; 8.6 TABLET ORAL at 08:24

## 2023-09-29 RX ADMIN — IBUPROFEN 800 MG: 800 TABLET ORAL at 02:54

## 2023-09-29 RX ADMIN — ACETAMINOPHEN 975 MG: 325 TABLET ORAL at 08:24

## 2023-09-29 RX ADMIN — ACETAMINOPHEN 975 MG: 325 TABLET ORAL at 02:53

## 2023-09-29 ASSESSMENT — ACTIVITIES OF DAILY LIVING (ADL)
ADLS_ACUITY_SCORE: 20

## 2023-09-29 NOTE — PROGRESS NOTES
"Birthplace RN Care Coordinator Note    Fadia Cota  [id number]  1990    Chart reviewed, discharge plan discussed with bedside RN, needs assessed. Patient requests home care visit as ordered, nurse visit planned for , 10/1/23, [facility] Intake updated by this writer, patient added to [facility] schedule. BP check planned in one week, and post-delivery & incision check in 2 weeks, at [facility].     Patient, Fadia, reports to have support at home and would like to discharge today with , \"Luke\". RN Care Coordinator will continue to follow and assist as needed with discharge plan.       "

## 2023-09-29 NOTE — DISCHARGE INSTRUCTIONS
Warning Signs after Having a Baby    Keep this paper on your fridge or somewhere else where you can see it.    Call your provider if you have any of these symptoms up to 12 weeks after having your baby.    Thoughts of hurting yourself or your baby  Pain in your chest or trouble breathing  Severe headache not helped by pain medicine  Eyesight concerns (blurry vision, seeing spots or flashes of light, other changes to eyesight)  Fainting, shaking or other signs of a seizure    Call 9-1-1 if you feel that it is an emergency.     The symptoms below can happen to anyone after giving birth. They can be very serious. Call your provider if you have any of these warning signs.    My provider s phone number: _______________________    Losing too much blood (hemorrhage)    Call your provider if you soak through a pad in less than an hour or pass blood clots bigger than a golf ball. These may be signs that you are bleeding too much.    Blood clots in the legs or lungs    After you give birth, your body naturally clots its blood to help prevent blood loss. Sometimes this increased clotting can happen in other areas of the body, like the legs or lungs. This can block your blood flow and be very dangerous.     Call your provider if you:  Have a red, swollen spot on the back of your leg that is warm or painful when you touch it.   Are coughing up blood.     Infection    Call your provider if you have any of these symptoms:  Fever of 100.4 F (38 C) or higher.  Pain or redness around your stitches if you had an incision.   Any yellow, white, or green fluid coming from places where you had stitches or surgery.    Mood Problems (postpartum depression)    Many people feel sad or have mood changes after having a baby. But for some people, these mood swings are worse.     Call your provider right away if you feel so anxious or nervous that you can't care for yourself or your baby.    Preeclampsia (high blood pressure)    Even if you  didn't have high blood pressure when you were pregnant, you are at risk for the high blood pressure disease called preeclampsia. This risk can last up to 12 weeks after giving birth.     Call your provider if you have:   Pain on your right side under your rib cage  Sudden swelling in the hands and face    Remember: You know your body. If something doesn't feel right, get medical help.     For informational purposes only. Not to replace the advice of your health care provider. Copyright 2020 [facility]. All rights reserved. Clinically reviewed by Carolyn Leblanc, RNC-OB, MSN. [employer] [id number] - Rev .     Section: What to Expect at Home  Your Recovery     A  section, or , is surgery to deliver your baby through a cut that the doctor makes in your lower belly and uterus. The cut is called an incision.  You may have some pain in your lower belly and need pain medicine for 1 to 2 weeks. You can expect some vaginal bleeding for several weeks. You will probably need about 6 weeks to fully recover.  It's important to take it easy while the incision heals. Avoid heavy lifting, strenuous activities, and exercises that strain the belly muscles while you recover. Ask a family member or friend for help with housework, cooking, and shopping.  This care sheet gives you a general idea about how long it will take for you to recover. But each person recovers at a different pace. Follow the steps below to get better as quickly as possible.  How can you care for yourself at home?  Activity    Rest when you feel tired. Getting enough sleep will help you recover.     Try to walk each day. Start by walking a little more than you did the day before. Bit by bit, increase the amount you walk. Walking boosts blood flow and helps prevent pneumonia, constipation, and blood clots.     Avoid strenuous activities, such as bicycle riding, jogging, weightlifting, and aerobic exercise, for 6 weeks or until  your doctor says it is okay.     Until your doctor says it is okay, do not lift anything heavier than your baby.     Do not do sit-ups or other exercises that strain the belly muscles for 6 weeks or until your doctor says it is okay.     Hold a pillow over your incision when you cough or take deep breaths. This will support your belly and decrease your pain.     You may shower as usual. Pat the incision dry when you are done.     You will have some vaginal bleeding. Wear sanitary pads. Do not douche or use tampons until your doctor says it is okay.     Ask your doctor when you can drive again.     You will probably need to take at least 6 weeks off work. It depends on the type of work you do and how you feel.     Ask your doctor when it is okay for you to have sex.   Diet    You can eat your normal diet. If your stomach is upset, try bland, low-fat foods like plain rice, broiled chicken, toast, and yogurt.     Drink plenty of fluids (unless your doctor tells you not to).     You may notice that your bowel movements are not regular right after your surgery. This is common. Try to avoid constipation and straining with bowel movements. You may want to take a fiber supplement every day. If you have not had a bowel movement after a couple of days, ask your doctor about taking a mild laxative.     If you are breastfeeding, limit alcohol. Alcohol can cause a lack of energy and other health problems for the baby when a breastfeeding woman drinks heavily. It can also get in the way of a mom's ability to feed her baby or to care for the child in other ways. There isn't a lot of research about exactly how much alcohol can harm a baby. Having no alcohol is the safest choice for your baby. If you choose to have a drink now and then, have only one drink, and limit the number of occasions that you have a drink. Wait to breastfeed at least 2 hours after you have a drink to reduce the amount of alcohol the baby may get in the milk.    Medicines    Your doctor will tell you if and when you can restart your medicines. You will also get instructions about taking any new medicines.     If you stopped taking aspirin or some other blood thinner, your doctor will tell you when to start taking it again.     Take pain medicines exactly as directed.  If the doctor gave you a prescription medicine for pain, take it as prescribed.  If you are not taking a prescription pain medicine, ask your doctor if you can take an over-the-counter medicine.     If you think your pain medicine is making you sick to your stomach:  Take your medicine after meals (unless your doctor has told you not to).  Ask your doctor for a different pain medicine.     If your doctor prescribed antibiotics, take them as directed. Do not stop taking them just because you feel better. You need to take the full course of antibiotics.   Incision care    If you have strips of tape on the incision, leave the tape on for a week or until it falls off.     Wash the area daily with warm, soapy water, and pat it dry. Don't use hydrogen peroxide or alcohol, which can slow healing. You may cover the area with a gauze bandage if it weeps or rubs against clothing. Change the bandage every day.     Keep the area clean and dry.   Other instructions    If you breastfeed your baby, you may be more comfortable while you are healing if you don't rest your baby on your belly. Try tucking your baby under your arm, with your baby's body along the side you will be feeding on. Support your baby's upper body with your arm. With that hand you can control your baby's head to bring your baby's mouth to your breast. This is sometimes called the football hold.   Follow-up care is a key part of your treatment and safety. Be sure to make and go to all appointments, and call your doctor if you are having problems. It's also a good idea to know your test results and keep a list of the medicines you take.  When should you  "call for help?  Share this information with your partner, family, or a friend. They can help you watch for warning signs.  Call 911  anytime you think you may need emergency care. For example, call if:    You have thoughts of harming yourself, your baby, or another person.     You passed out (lost consciousness).     You have chest pain, are short of breath, or cough up blood.     You have a seizure.   Call your doctor now or seek immediate medical care if:    You have loose stitches, or your incision comes open.     You have signs of hemorrhage (too much bleeding), such as:  Heavy vaginal bleeding. This means that you are soaking through one or more pads in an hour. Or you pass blood clots bigger than an egg.  Feeling dizzy or lightheaded, or you feel like you may faint.  Feeling so tired or weak that you cannot do your usual activities.  A fast or irregular heartbeat.  New or worse belly pain.     You have symptoms of infection, such as:  Increased pain, swelling, warmth, or redness.  Red streaks leading from the incision.  Pus draining from the incision.  A fever.  Vaginal discharge that smells bad.  New or worse belly pain.     You have symptoms of a blood clot in your leg (called a deep vein thrombosis), such as:  Pain in your calf, back of the knee, thigh, or groin.  Redness and swelling in your leg or groin.     You have signs of preeclampsia, such as:  Sudden swelling of your face, hands, or feet.  New vision problems (such as dimness, blurring, or seeing spots).  A severe headache.   Watch closely for changes in your health, and be sure to contact your doctor if:    Your vaginal bleeding isn't decreasing.     You feel sad, anxious, or hopeless for more than a few days.     You are having problems with your breasts or breastfeeding.   Where can you learn more?  Go to https://www.healthwise.net/patiented  Enter M806 in the search box to learn more about \" Section: What to Expect at Home.\"  Current as " of: November 9, 2022               Content Version: 13.7    4745-7560 [employer].   Care instructions adapted under license by your healthcare professional. If you have questions about a medical condition or this instruction, always ask your healthcare professional. [employer] disclaims any warranty or liability for your use of this information.

## 2023-09-29 NOTE — PLAN OF CARE
Goal Outcome Evaluation:    Discharge instructions and warning signs reviewed with Fadia and her spouse. Education completed. She was provided with her paper prescriptions for discharge. A home nurse visit is planned for Hector 10/1/23.    Problem: Plan of Care   Goal: Readiness for Transition of Care  Outcome: Met

## 2023-09-29 NOTE — PLAN OF CARE
Problem: Plan of Care - These are the overarching goals to be used throughout the patient stay.    Goal: Plan of Care Review  Description: The Plan of Care Review/Shift note should be completed every shift.  The Outcome Evaluation is a brief statement about your assessment that the patient is improving, declining, or no change.  This information will be displayed automatically on your shift note.  Outcome: Progressing     Problem: Postpartum ( Delivery)  Goal: Optimal Pain Control and Function  Outcome: Progressing  Intervention: Prevent or Manage Pain  Recent Flowsheet Documentation  Taken 2023 192 by Kristi Alcala, RN  Pain Management Interventions: declines  Taken 2023 1900 by Kristi Alcala, RN  Pain Management Interventions: medication (see MAR)  Taken 2023 1740 by Kristi Alcala, RN  Pain Management Interventions:   declines   rest   repositioned  Perineal Care:   perineum cleansed   absorbent brief/pad changed   Goal Outcome Evaluation:                  Pt VSS, BP has been within range during shift. Pt pain has been controlled with tylenol, ibuprofen, and pt just utilized oxycodone for first time. Pt will rate pain less than 3/10 throughout shift. Pt is currently resting in room. Education provided. Addressed any questions and concerns. Will continue to monitor.

## 2023-09-29 NOTE — DISCHARGE SUMMARY
OB Discharge Summary      Date:  2023    Name:  Fadia Cota  :  1990  MRN:  [id number]      Admission Date:  2023  Delivery Date: 2023   Gestational Age at Delivery:  38w2d  Discharge Date:  2023    Principal Diagnosis:    Patient Active Problem List   Diagnosis    Chronic hypertension affecting pregnancy    S/P  section    Preeclampsia, severe         Conditions complicating Pregnancy:   POD#2 s/p repeat . CHTN w/ superimposed pre-e s/p magnesium sulfate x 24 hours postpartum (off ). Normotensive now.     Procedure(s) Performed:  Spinal, repeat low transverse  section, TAP blocks    Indication for :  CHTN with superimposed pre eclampsia and history of previous  section  Indication for Induction:  none     Condition at Discharge:  Good    Discharge Medications:      Review of your medicines        START taking        Dose / Directions   acetaminophen 325 MG tablet  Commonly known as: TYLENOL      Dose: 975 mg  Take 3 tablets (975 mg) by mouth every 6 hours  Quantity: 50 tablet  Refills: 0     ibuprofen 800 MG tablet  Commonly known as: ADVIL/MOTRIN      Dose: 800 mg  Take 1 tablet (800 mg) by mouth every 8 hours as needed for moderate pain  Quantity: 30 tablet  Refills: 0     oxyCODONE 5 MG tablet  Commonly known as: ROXICODONE      Dose: 5 mg  Take 1 tablet (5 mg) by mouth every 6 hours as needed for breakthrough pain  Quantity: 12 tablet  Refills: 0     senna-docusate 8.6-50 MG tablet  Commonly known as: SENOKOT-S/PERICOLACE      Dose: 1 tablet  Take 1 tablet by mouth daily as needed for constipation  Quantity: 60 tablet  Refills: 0            CONTINUE these medicines which may have CHANGED, or have new prescriptions. If we are uncertain of the size of tablets/capsules you have at home, strength may be listed as something that might have changed.        Dose / Directions   NIFEdipine ER OSMOTIC 60 MG 24 hr tablet  Commonly known as:  PROCARDIA XL  This may have changed: Another medication with the same name was removed. Continue taking this medication, and follow the directions you see here.  Used for: Chronic hypertension affecting pregnancy      TAKE 1 TABLET(60 MG) BY MOUTH DAILY  Quantity: 90 tablet  Refills: 0            CONTINUE these medicines which have NOT CHANGED        Dose / Directions   aspirin 81 MG EC tablet      Dose: 81 mg  Take 81 mg by mouth daily  Refills: 0     Prenatal Vitamin 27-0.8 MG Tabs      Refills: 0            STOP taking      betamethasone valerate 0.1 % external ointment  Commonly known as: VALISONE                  Where to get your medicines        Some of these will need a paper prescription and others can be bought over the counter. Ask your nurse if you have questions.    Bring a paper prescription for each of these medications  acetaminophen 325 MG tablet  ibuprofen 800 MG tablet  oxyCODONE 5 MG tablet  senna-docusate 8.6-50 MG tablet          Discharge Plan:    Follow up with /ANGIEM:  [facility] in 1 week for a blood pressure check and in 2 weeks for an incision check   Patient Instructions:      Physical activity: No driving while on narctoics. No lifting anything greater than 15lbs for 6 weeks. Nothing in the vagina for 6 weeks.     Diet:  Regular. Drink 100 oz (3 liters) daily.     Medication: As listed above. May alternate ibuprofen and acetaminophen for pain management.       Physician/CNM: LIVAN Lizama CNM    Name:  Fadia FELICIANO Cipriano  :  1990  MRN:  [id number]

## 2023-09-29 NOTE — PLAN OF CARE
Pt's vital signs WNL. Postpartum assessment WNL, bleeding scant firm at U. Pt independent, ambulating well, voiding without difficulty. Breastfeeding every 2-3 hours, baby has been cluster feeding overnight. Caring for baby independently. Unable to visualize due to pressure dressing. Dressing is clean, dry and intact.   Pain managed with tylenol, ibuprofen and oxycodone.     Lorena Newton RN on 9/29/2023 at 5:58 AM

## 2023-09-29 NOTE — PROGRESS NOTES
" Postpartum Day 3    Patient Name:  Fadia Cota  :  1990  MRN:  [id number]      Assessment:  Day 3 s/p repeat . cHTN with SPEC s/p magnesium sulfate. Normotensive now.    Plan:  Continue current care, will continue Nifedipine 60mgXL daily-already has adequate Rx for this at home.  Discharge home.  Has homecare visit for 10/1, anticipate blood pressure check then. Follow up in 1 week for blood pressure check at [facility].    Subjective:  The patient feels well:  Voiding without difficulty, lochia normal, tolerating normal diet, ambulating and passing flatus.  She denies headache, vision changes, URQ/epigastric pain, dizziness, lightheadedness, shortness of breath, and tachycardia. Pain is well controlled with current medications.  The patient has no emotional concerns.  The baby is well.    YOB: 2024   Birth Time: 8:38 PM     Prenatal complications: cHTN on Nifedipine XL 60mg at bedtime; history of prior CS  for arrest of dilation at 6cm, prolonged ROM, cHTN at 39+2wks      Objective:  /72 (BP Location: Left arm)   Pulse 74   Temp 98.4  F (36.9  C) (Oral)   Resp 16   Ht 1.727 m (5' 8\")   Wt 77.8 kg (171 lb 9.6 oz)   LMP 2023   SpO2 96%   Breastfeeding Unknown   BMI 26.09 kg/m    Patient Vitals for the past 24 hrs:   BP Temp Temp src Pulse Resp SpO2 Weight   23 0600 113/72 98.4  F (36.9  C) Oral 74 16 96 % 77.8 kg (171 lb 9.6 oz)   23 0245 115/65 98.3  F (36.8  C) Oral 68 16 95 % --   23 2222 129/75 -- -- -- -- -- --   23 1928 127/74 98.7  F (37.1  C) Oral 67 18 98 % --   23 1630 123/77 98.9  F (37.2  C) Oral 61 18 94 % --   23 1244 124/79 98.7  F (37.1  C) Oral 74 20 97 % --       Exam: Patient A&O x 3. No acute distress, breathing unlabored. The amount and color of the lochia is appropriate for the duration of recovery.  The incision is healing well.  The patient is ambulating well.  The patient is tolerating a regular " diet.    Lab Results   Component Value Date    AS Negative 09/26/2023    HGB 12.0 09/27/2023       Immunization History   Administered Date(s) Administered    COVID-19 MONOVALENT 12+ (Pfizer) 08/16/2021, 09/07/2021, 02/12/2022    TDAP Vaccine (Adacel) 09/24/2021       Provider:  LIVAN Lizama CNM     Date:  9/29/2023  Time:  8:41 AM

## 2024-07-11 ENCOUNTER — HOSPITAL ENCOUNTER (OUTPATIENT)
Dept: ULTRASOUND IMAGING | Facility: HOSPITAL | Age: 34
Discharge: HOME | End: 2024-07-11
Attending: FAMILY MEDICINE
Payer: COMMERCIAL

## 2024-07-11 ENCOUNTER — HOSPITAL ENCOUNTER (OUTPATIENT)
Dept: CARDIOLOGY | Facility: HOSPITAL | Age: 34
Discharge: HOME | End: 2024-07-11
Attending: FAMILY MEDICINE
Payer: COMMERCIAL

## 2024-07-11 DIAGNOSIS — I10 HTN (HYPERTENSION): ICD-10-CM

## 2024-07-11 LAB — LVEF ECHO: NORMAL

## 2024-07-11 PROCEDURE — 93306 TTE W/DOPPLER COMPLETE: CPT

## 2024-07-11 PROCEDURE — 93306 TTE W/DOPPLER COMPLETE: CPT | Mod: 26 | Performed by: INTERNAL MEDICINE

## 2024-07-11 PROCEDURE — 93975 VASCULAR STUDY: CPT

## 2024-07-27 ENCOUNTER — HEALTH MAINTENANCE LETTER (OUTPATIENT)
Age: 34
End: 2024-07-27

## 2025-04-22 ENCOUNTER — TRANSFERRED RECORDS (OUTPATIENT)
Dept: HEALTH INFORMATION MANAGEMENT | Facility: CLINIC | Age: 35
End: 2025-04-22
Payer: COMMERCIAL

## 2025-04-25 ENCOUNTER — MEDICAL CORRESPONDENCE (OUTPATIENT)
Dept: HEALTH INFORMATION MANAGEMENT | Facility: CLINIC | Age: 35
End: 2025-04-25
Payer: COMMERCIAL

## 2025-04-29 ENCOUNTER — TRANSCRIBE ORDERS (OUTPATIENT)
Dept: MATERNAL FETAL MEDICINE | Facility: HOSPITAL | Age: 35
End: 2025-04-29
Payer: COMMERCIAL

## 2025-04-29 DIAGNOSIS — O26.90 PREGNANCY RELATED CONDITION, ANTEPARTUM: Primary | ICD-10-CM

## 2025-06-23 ENCOUNTER — PRE VISIT (OUTPATIENT)
Dept: MATERNAL FETAL MEDICINE | Facility: HOSPITAL | Age: 35
End: 2025-06-23
Payer: COMMERCIAL

## 2025-06-23 RX ORDER — NIFEDIPINE 30 MG
30 TABLET, EXTENDED RELEASE ORAL EVERY MORNING
COMMUNITY

## 2025-06-27 ENCOUNTER — ANCILLARY PROCEDURE (OUTPATIENT)
Dept: ULTRASOUND IMAGING | Facility: HOSPITAL | Age: 35
End: 2025-06-27
Attending: OBSTETRICS & GYNECOLOGY
Payer: COMMERCIAL

## 2025-06-27 DIAGNOSIS — O26.90 PREGNANCY RELATED CONDITION, ANTEPARTUM: ICD-10-CM

## 2025-06-27 PROCEDURE — 76811 OB US DETAILED SNGL FETUS: CPT | Mod: 26 | Performed by: OBSTETRICS & GYNECOLOGY

## 2025-06-27 PROCEDURE — 76811 OB US DETAILED SNGL FETUS: CPT

## 2025-08-10 ENCOUNTER — HEALTH MAINTENANCE LETTER (OUTPATIENT)
Age: 35
End: 2025-08-10

## (undated) DEVICE — GOWN IMPERVIOUS BREATHABLE SMART LG 89015

## (undated) DEVICE — CUSTOM PACK C-SECTION LHE

## (undated) DEVICE — GLOVE SURG PI ULTRA TOUCH M SZ 6-1/2 LF

## (undated) DEVICE — PACK MAJOR BASIN 673

## (undated) DEVICE — SOL WATER IRRIG 1000ML BOTTLE 2F7114

## (undated) DEVICE — PAD INSERT MED PEACH 14X6.5 62550

## (undated) DEVICE — SU DERMABOND ADVANCED .7ML DNX12

## (undated) DEVICE — PLATE GROUNDING ADULT W/CORD 9165L

## (undated) DEVICE — SURGICEL POWDER ABSORBABLE HEMOSTAT 3GM 3013SP

## (undated) DEVICE — SUTURE VICRYL 0 BLUNT CTB-1 VCPB946H

## (undated) DEVICE — UNDERPAD 30X30 BULK 949B10

## (undated) DEVICE — SUCTION MANIFOLD NEPTUNE 2 SYS 1 PORT 702-025-000

## (undated) DEVICE — SOL NACL 0.9% IRRIG 1000ML BOTTLE 2F7124

## (undated) DEVICE — PACK MINOR SINGLE BASIN SSK3001

## (undated) DEVICE — PAD ABD 7.5INW X 8INL NON-WOVEN FLUFF-FILLED 9192A

## (undated) DEVICE — PREP CHLORAPREP 26ML TINTED HI-LITE ORANGE 930815

## (undated) DEVICE — DRSG PRIMAPORE 04X11 3/4"

## (undated) DEVICE — GLOVE BIOGEL PI ULTRATOUCH G SZ 6.5 42165

## (undated) DEVICE — SUTURE MONOCRYL+ 0 CT-1 UND 18 MCP946H

## (undated) DEVICE — PREP DURAPREP 26ML APL 8630

## (undated) DEVICE — DRAPE IOBAN 2 C-SECTION 3M 6697

## (undated) DEVICE — SPONGE LAP 18X18" X8435

## (undated) DEVICE — SU VICRYL 3-0 CT-2 27" UND J232H

## (undated) DEVICE — SUTURE MONOCRYL+ 4-0 PS-2 27IN MCP426H

## (undated) DEVICE — CATH SUCTION 10FR W/TRAP DYND44110

## (undated) DEVICE — DRESSING MEPILEX BORDER POST-OP 4X12

## (undated) DEVICE — SU PLAIN 3-0 CT 27" 852H

## (undated) DEVICE — LINER PRINTED RED HAZARD 24X24 A4824PRRO

## (undated) RX ORDER — ONDANSETRON 2 MG/ML
INJECTION INTRAMUSCULAR; INTRAVENOUS
Status: DISPENSED
Start: 2023-09-26

## (undated) RX ORDER — FENTANYL CITRATE-0.9 % NACL/PF 10 MCG/ML
PLASTIC BAG, INJECTION (ML) INTRAVENOUS
Status: DISPENSED
Start: 2023-09-26

## (undated) RX ORDER — MORPHINE SULFATE 1 MG/ML
INJECTION, SOLUTION EPIDURAL; INTRATHECAL; INTRAVENOUS
Status: DISPENSED
Start: 2023-09-26